# Patient Record
Sex: FEMALE | Race: WHITE | Employment: UNEMPLOYED | URBAN - METROPOLITAN AREA
[De-identification: names, ages, dates, MRNs, and addresses within clinical notes are randomized per-mention and may not be internally consistent; named-entity substitution may affect disease eponyms.]

---

## 2022-07-05 ENCOUNTER — HOSPITAL ENCOUNTER (INPATIENT)
Age: 32
LOS: 2 days | Discharge: HOME | DRG: 753 | End: 2022-07-07
Attending: EMERGENCY MEDICINE | Admitting: PSYCHIATRY & NEUROLOGY
Payer: COMMERCIAL

## 2022-07-05 VITALS
HEART RATE: 53 BPM | HEIGHT: 68 IN | DIASTOLIC BLOOD PRESSURE: 57 MMHG | WEIGHT: 214 LBS | SYSTOLIC BLOOD PRESSURE: 120 MMHG | BODY MASS INDEX: 32.43 KG/M2 | RESPIRATION RATE: 16 BRPM | TEMPERATURE: 98.6 F | OXYGEN SATURATION: 99 %

## 2022-07-05 DIAGNOSIS — F10.920 ACUTE ALCOHOLIC INTOXICATION WITHOUT COMPLICATION (HCC): Primary | ICD-10-CM

## 2022-07-05 DIAGNOSIS — F25.0 SCHIZOAFFECTIVE DISORDER, BIPOLAR TYPE (HCC): ICD-10-CM

## 2022-07-05 PROBLEM — F31.9 BIPOLAR 1 DISORDER, DEPRESSED (HCC): Status: ACTIVE | Noted: 2022-07-05

## 2022-07-05 LAB
ACETAMINOPHEN LEVEL: <5 UG/ML (ref 10–30)
ALBUMIN SERPL-MCNC: 4.7 G/DL (ref 3.5–4.6)
ALP BLD-CCNC: 50 U/L (ref 40–130)
ALT SERPL-CCNC: 20 U/L (ref 0–33)
AMPHETAMINE SCREEN, URINE: NORMAL
ANION GAP SERPL CALCULATED.3IONS-SCNC: 12 MEQ/L (ref 9–15)
AST SERPL-CCNC: 21 U/L (ref 0–35)
BACTERIA: NEGATIVE /HPF
BARBITURATE SCREEN URINE: NORMAL
BASOPHILS ABSOLUTE: 0.1 K/UL (ref 0–0.2)
BASOPHILS RELATIVE PERCENT: 1 %
BENZODIAZEPINE SCREEN, URINE: NORMAL
BILIRUB SERPL-MCNC: <0.2 MG/DL (ref 0.2–0.7)
BILIRUBIN URINE: NEGATIVE
BLOOD, URINE: ABNORMAL
BUN BLDV-MCNC: 6 MG/DL (ref 6–20)
CALCIUM SERPL-MCNC: 8.6 MG/DL (ref 8.5–9.9)
CANNABINOID SCREEN URINE: NORMAL
CHLORIDE BLD-SCNC: 111 MEQ/L (ref 95–107)
CHOLESTEROL, TOTAL: 138 MG/DL (ref 0–199)
CLARITY: CLEAR
CO2: 22 MEQ/L (ref 20–31)
COCAINE METABOLITE SCREEN URINE: NORMAL
COLOR: YELLOW
CREAT SERPL-MCNC: 0.63 MG/DL (ref 0.5–0.9)
EOSINOPHILS ABSOLUTE: 0.1 K/UL (ref 0–0.7)
EOSINOPHILS RELATIVE PERCENT: 0.9 %
EPITHELIAL CELLS, UA: ABNORMAL /HPF (ref 0–5)
ETHANOL PERCENT: 0.07 G/DL
ETHANOL PERCENT: 0.14 G/DL
ETHANOL: 158 MG/DL (ref 0–0.08)
ETHANOL: 77 MG/DL (ref 0–0.08)
FENTANYL SCREEN, URINE: NORMAL
GFR AFRICAN AMERICAN: >60
GFR NON-AFRICAN AMERICAN: >60
GLOBULIN: 2.6 G/DL (ref 2.3–3.5)
GLUCOSE BLD-MCNC: 107 MG/DL (ref 70–99)
GLUCOSE URINE: NEGATIVE MG/DL
HCT VFR BLD CALC: 39.6 % (ref 37–47)
HDLC SERPL-MCNC: 59 MG/DL (ref 40–59)
HEMOGLOBIN: 13 G/DL (ref 12–16)
HYALINE CASTS: ABNORMAL /HPF (ref 0–5)
KETONES, URINE: NEGATIVE MG/DL
LDL CHOLESTEROL CALCULATED: 64 MG/DL (ref 0–129)
LEUKOCYTE ESTERASE, URINE: NEGATIVE
LYMPHOCYTES ABSOLUTE: 3.4 K/UL (ref 1–4.8)
LYMPHOCYTES RELATIVE PERCENT: 44.7 %
Lab: NORMAL
MCH RBC QN AUTO: 26.8 PG (ref 27–31.3)
MCHC RBC AUTO-ENTMCNC: 32.8 % (ref 33–37)
MCV RBC AUTO: 81.7 FL (ref 82–100)
METHADONE SCREEN, URINE: NORMAL
MONOCYTES ABSOLUTE: 0.4 K/UL (ref 0.2–0.8)
MONOCYTES RELATIVE PERCENT: 5.7 %
NEUTROPHILS ABSOLUTE: 3.6 K/UL (ref 1.4–6.5)
NEUTROPHILS RELATIVE PERCENT: 47.7 %
NITRITE, URINE: NEGATIVE
OPIATE SCREEN URINE: NORMAL
OXYCODONE URINE: NORMAL
PDW BLD-RTO: 17.7 % (ref 11.5–14.5)
PH UA: 6 (ref 5–9)
PHENCYCLIDINE SCREEN URINE: NORMAL
PLATELET # BLD: 262 K/UL (ref 130–400)
POTASSIUM REFLEX MAGNESIUM: 4 MEQ/L (ref 3.4–4.9)
PROPOXYPHENE SCREEN: NORMAL
PROTEIN UA: NEGATIVE MG/DL
RBC # BLD: 4.85 M/UL (ref 4.2–5.4)
RBC UA: ABNORMAL /HPF (ref 0–5)
SALICYLATE, SERUM: <0.3 MG/DL (ref 15–30)
SARS-COV-2, NAAT: NOT DETECTED
SODIUM BLD-SCNC: 145 MEQ/L (ref 135–144)
SPECIFIC GRAVITY UA: 1.01 (ref 1–1.03)
TOTAL CK: 168 U/L (ref 0–170)
TOTAL PROTEIN: 7.3 G/DL (ref 6.3–8)
TRIGL SERPL-MCNC: 73 MG/DL (ref 0–150)
TSH REFLEX: 1.55 UIU/ML (ref 0.44–3.86)
URINE REFLEX TO CULTURE: ABNORMAL
UROBILINOGEN, URINE: 1 E.U./DL
WBC # BLD: 7.6 K/UL (ref 4.8–10.8)
WBC UA: ABNORMAL /HPF (ref 0–5)

## 2022-07-05 PROCEDURE — 82550 ASSAY OF CK (CPK): CPT

## 2022-07-05 PROCEDURE — 80307 DRUG TEST PRSMV CHEM ANLYZR: CPT

## 2022-07-05 PROCEDURE — 82077 ASSAY SPEC XCP UR&BREATH IA: CPT

## 2022-07-05 PROCEDURE — 80061 LIPID PANEL: CPT

## 2022-07-05 PROCEDURE — 1240000000 HC EMOTIONAL WELLNESS R&B

## 2022-07-05 PROCEDURE — 85025 COMPLETE CBC W/AUTO DIFF WBC: CPT

## 2022-07-05 PROCEDURE — 80143 DRUG ASSAY ACETAMINOPHEN: CPT

## 2022-07-05 PROCEDURE — 80179 DRUG ASSAY SALICYLATE: CPT

## 2022-07-05 PROCEDURE — 36415 COLL VENOUS BLD VENIPUNCTURE: CPT

## 2022-07-05 PROCEDURE — 81001 URINALYSIS AUTO W/SCOPE: CPT

## 2022-07-05 PROCEDURE — 80053 COMPREHEN METABOLIC PANEL: CPT

## 2022-07-05 PROCEDURE — 12002 RPR S/N/AX/GEN/TRNK2.6-7.5CM: CPT

## 2022-07-05 PROCEDURE — 99285 EMERGENCY DEPT VISIT HI MDM: CPT

## 2022-07-05 PROCEDURE — 84443 ASSAY THYROID STIM HORMONE: CPT

## 2022-07-05 PROCEDURE — 87635 SARS-COV-2 COVID-19 AMP PRB: CPT

## 2022-07-05 RX ORDER — HYDROXYZINE PAMOATE 50 MG/1
50 CAPSULE ORAL EVERY 6 HOURS PRN
Status: DISCONTINUED | OUTPATIENT
Start: 2022-07-05 | End: 2022-07-07 | Stop reason: HOSPADM

## 2022-07-05 RX ORDER — ACETAMINOPHEN 325 MG/1
650 TABLET ORAL EVERY 4 HOURS PRN
Status: DISCONTINUED | OUTPATIENT
Start: 2022-07-05 | End: 2022-07-07 | Stop reason: HOSPADM

## 2022-07-05 RX ORDER — ARIPIPRAZOLE 20 MG/1
20 TABLET ORAL DAILY
Status: ON HOLD | COMMUNITY
Start: 2022-05-26 | End: 2022-07-07 | Stop reason: HOSPADM

## 2022-07-05 RX ORDER — HYDROXYZINE HYDROCHLORIDE 50 MG/ML
50 INJECTION, SOLUTION INTRAMUSCULAR EVERY 6 HOURS PRN
Status: DISCONTINUED | OUTPATIENT
Start: 2022-07-05 | End: 2022-07-07 | Stop reason: HOSPADM

## 2022-07-05 RX ORDER — GABAPENTIN 300 MG/1
600 CAPSULE ORAL 2 TIMES DAILY
Status: DISCONTINUED | OUTPATIENT
Start: 2022-07-05 | End: 2022-07-07 | Stop reason: HOSPADM

## 2022-07-05 RX ORDER — GABAPENTIN 300 MG/1
600 CAPSULE ORAL 2 TIMES DAILY
Status: ON HOLD | COMMUNITY
Start: 2022-05-26 | End: 2022-10-28 | Stop reason: HOSPADM

## 2022-07-05 RX ORDER — BENZTROPINE MESYLATE 1 MG/ML
2 INJECTION INTRAMUSCULAR; INTRAVENOUS 2 TIMES DAILY PRN
Status: DISCONTINUED | OUTPATIENT
Start: 2022-07-05 | End: 2022-07-07 | Stop reason: HOSPADM

## 2022-07-05 RX ORDER — HALOPERIDOL 5 MG/ML
5 INJECTION INTRAMUSCULAR EVERY 6 HOURS PRN
Status: DISCONTINUED | OUTPATIENT
Start: 2022-07-05 | End: 2022-07-07 | Stop reason: HOSPADM

## 2022-07-05 RX ORDER — LIDOCAINE HYDROCHLORIDE 10 MG/ML
5 INJECTION, SOLUTION EPIDURAL; INFILTRATION; INTRACAUDAL; PERINEURAL ONCE
Status: DISCONTINUED | OUTPATIENT
Start: 2022-07-05 | End: 2022-07-05

## 2022-07-05 RX ORDER — MAGNESIUM HYDROXIDE/ALUMINUM HYDROXICE/SIMETHICONE 120; 1200; 1200 MG/30ML; MG/30ML; MG/30ML
30 SUSPENSION ORAL PRN
Status: DISCONTINUED | OUTPATIENT
Start: 2022-07-05 | End: 2022-07-07 | Stop reason: HOSPADM

## 2022-07-05 RX ORDER — TRAZODONE HYDROCHLORIDE 50 MG/1
50 TABLET ORAL NIGHTLY PRN
Status: DISCONTINUED | OUTPATIENT
Start: 2022-07-05 | End: 2022-07-07 | Stop reason: HOSPADM

## 2022-07-05 RX ORDER — HALOPERIDOL 5 MG
5 TABLET ORAL EVERY 6 HOURS PRN
Status: DISCONTINUED | OUTPATIENT
Start: 2022-07-05 | End: 2022-07-07 | Stop reason: HOSPADM

## 2022-07-05 RX ORDER — PHENOL 1.4 %
1 AEROSOL, SPRAY (ML) MUCOUS MEMBRANE NIGHTLY
Status: ON HOLD | COMMUNITY
Start: 2022-05-26 | End: 2022-10-28 | Stop reason: HOSPADM

## 2022-07-05 ASSESSMENT — LIFESTYLE VARIABLES
HOW OFTEN DO YOU HAVE A DRINK CONTAINING ALCOHOL: 2-3 TIMES A WEEK
HOW MANY STANDARD DRINKS CONTAINING ALCOHOL DO YOU HAVE ON A TYPICAL DAY: 3 OR 4
HOW MANY STANDARD DRINKS CONTAINING ALCOHOL DO YOU HAVE ON A TYPICAL DAY: 1 OR 2
HOW OFTEN DO YOU HAVE A DRINK CONTAINING ALCOHOL: 2-3 TIMES A WEEK

## 2022-07-05 ASSESSMENT — SLEEP AND FATIGUE QUESTIONNAIRES
SLEEP PATTERN: DIFFICULTY FALLING ASLEEP;RESTLESSNESS
AVERAGE NUMBER OF SLEEP HOURS: 4
DO YOU USE A SLEEP AID: NO
DO YOU HAVE DIFFICULTY SLEEPING: YES

## 2022-07-05 ASSESSMENT — PAIN - FUNCTIONAL ASSESSMENT: PAIN_FUNCTIONAL_ASSESSMENT: NONE - DENIES PAIN

## 2022-07-05 ASSESSMENT — PATIENT HEALTH QUESTIONNAIRE - PHQ9: SUM OF ALL RESPONSES TO PHQ QUESTIONS 1-9: 12

## 2022-07-05 NOTE — ED NOTES
Patient to [facility] AN AFFILIATE OF [facility]   Patient changed into psych approved clothing   Skin assessment completed by [facility] RN        Raina Crowley RN  07/05/22 8475

## 2022-07-05 NOTE — ED NOTES
Breakfast tray remains untouched. Continues to rest quietly with eyes closed & no acute distress noted.      Whit Ferreira RN  07/05/22 6529

## 2022-07-05 NOTE — ED NOTES
Awaiting alcohol redraw per report from [facility]. Resting with eyes closed & no acute distress noted.      Tab Wan RN  07/05/22 6614

## 2022-07-05 NOTE — ED NOTES
Spoke with Edith Salazar in the Lab to notify of need for alcohol redraw.      Savi Carrington RN  07/05/22 3237
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
## 2022-07-05 NOTE — ED NOTES
Patient updated on plan of care and admission to 3. Patient verbalizes understanding. No lunch eaten. Patient states she has no appetite at this time. Call placed to [facility] for belongings and escort to 3.       Erin Siu RN  07/05/22 7477

## 2022-07-05 NOTE — PROGRESS NOTES
Pt refused offer for vistaril for anxiety and tylenol for pain as pt states tylenol doesn't work, pt refused Neurontin 300 mg , 2 capsules were returned as pt reports she takes them tid prn. Pt was tearful, stated her heart is broke, encouraged pt to ask for the prn meds if she changes her mind, pt reports not eating lunch, offer for snacks were given.
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
## 2022-07-05 NOTE — PROGRESS NOTES
Pt arrived on unit 1303 by wheelchair. Pt appears agitated. Pt has strong gait and walked to room 384. Skin and contraband check performed by this RN and Melonie Better RN. Contraband negative. Pt has healing sutures on right forearm above AC. Pt also has healed scar on upper right arm. SI/HI. Pt given an toothbrush/toothpaste, soap, socks, deodorant, and water pitcher. Pt denied further needs. Pt aware of the need to complete admission assessment and sign consents. Pt currently laying in bed comfortably.
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
## 2022-07-05 NOTE — ED NOTES
Per report of Dr Melany Ma patient endorsed that she is intoxicated  Patient will not be assessed until blood ETOH level results and is at or below legal limit.      Anny Wells RN  07/05/22 1970

## 2022-07-06 PROCEDURE — 1240000000 HC EMOTIONAL WELLNESS R&B

## 2022-07-06 PROCEDURE — 99223 1ST HOSP IP/OBS HIGH 75: CPT | Performed by: PSYCHIATRY & NEUROLOGY

## 2022-07-06 NOTE — PROGRESS NOTES
Patient did not attend group despite staff encouragement. .Electronically signed by [employer] on 7/6/2022 at 12:13 PM

## 2022-07-06 NOTE — CARE COORDINATION
BHI Biopsychosocial Assessment    Current Level of Psychosocial Functioning     Independent   Dependent    Minimal Assist x    Comments:  Patient was living with her boyfriend. She is not sure whether they will reconcile. Patient works and receives some government assistance. Psychosocial High Risk Factors (check all that apply)    Unable to obtain meds   Chronic illness/pain    Substance abuse   Lack of Family Support x  Financial stress   Isolation   Inadequate Community Resources x  Suicide attempt(s)  Not taking medications x  Victim of crime   Developmental Delay  Unable to manage personal needs    Age 72 or older   Homeless  No transportation   Readmission within 30 days  Unemployment  Traumatic Event    Comments: Patient has at least three high risk factors associated with this admission. Psychiatric Advanced Directives: None Reported. Family to Involve in Treatment: Patient stated she has no one for staff to contact on her behalf. Sexual Orientation:  Patient was recently in a heterosexual relationship. Patient Strengths: Patient is seeking assistance at the [facility]. She has a pending appointment. Patient Barriers: Patient believes she will not be able to return to her living situation due to the disruption in her relationship with her boyfriend. Opiate Education Provided:  N/A    CMHC/mental health history: None Reported. Plan of Care   medication management, group/individual therapies, family meetings, psycho -education, treatment team meetings to assist with stabilization    Initial Discharge Plan:  Patient is seeking alternative living situation and has an appointment at [facility]. Clinical Summary:    Patient is a 33year old female who was admitted to the [facility] due to a mental health decompensation. Reportedly, patient presented to the ER with a cut to her right forearm, alcohol intoxication, and an escalation in disruptive behaviors.  All of these conditions are associated with a recent argument. When interviewed, patient verbalized several negative thoughts. She did not think it was a good morning, she expressed her discontent with her former partner, and said no one is willing to help her anymore. Patient was hesitantly cooperative when answering questions. She indicated she has a connection at the [facility]. Patient was extremely discharged focused. Her plan is to speak to the doctor about discharge.      Electronically signed by Candice Avina on 7/6/2022 at 9:59 AM
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
## 2022-07-06 NOTE — PROGRESS NOTES
Patient did not attend group despite staff encouragement.   Electronically signed by Kisha Alexander on 7/5/2022 at 9:17 PM

## 2022-07-06 NOTE — PROGRESS NOTES
Pt. refused to attend the 0900 community meeting. .Electronically signed by Lynne Otero on 7/6/2022 at 9:31 AM

## 2022-07-06 NOTE — PROGRESS NOTES
Patient did not attend group despite staff encouragement.   Electronically signed by Zulma Maher on 7/5/2022 at 9:10 PM

## 2022-07-06 NOTE — CARE COORDINATION
Brief Intervention and Referral to Treatment Summary    Patient was provided PHQ-9, AUDIT-C and DAST Screening:      PHQ-9 Score: 12  AUDIT-C Score: 5  DAST Score:  0    Patients substance use is considered     Low Risk/Healthy  Moderate Risk x  Harmful  Dependent    Patients depression is considered:     Minimal  Mild   Moderate x  Moderately Severe  Severe    Brief Education Was Provided N/A    Patient was receptive  Patient was not receptive      Brief Intervention Is Provided (Only for AUDIT-C or DAST) N/A    Patient reports readiness to decrease and/or stop use and a plan was discussed   Patient denies readiness to decrease and/or stop use and a plan was not discussed      Recommendations/Referrals for Brief and/or Specialized Treatment Provided to Patient   Patient denied any current difficulties with drugs or alcohol. As a result, no brief education or intervention was completed.      Electronically signed by Ede Madrid on 7/6/2022 at 9:44 AM

## 2022-07-06 NOTE — CONSULTS
[facility]  MEDICINE    HISTORY AND PHYSICAL EXAM    PATIENT NAME:  Jung Alan    MRN:  [id number]  SERVICE DATE:  7/6/2022   SERVICE TIME:  8:51 AM    Primary Care Physician: No primary care provider on file. SUBJECTIVE  CHIEF COMPLAINT:  Medically appropriate for inpatient psychiatry admission. Consult for medical H/P encounter. HPI:  This is a 33 y.o. female who is admitted to the behavioral health unit for further psychiatric evaluation. Patient initially presented to the emergency department due to self-inflicted right forearm laceration. She was noted to be intoxicated. Admitted to depression and self-injurious behavior. Wound was sutured in the emergency department patient was medically cleared for behavioral health admission. Hospitalist service has been placed on consult for medical evaluation. All pertinent lab work, imaging, vital signs, documentation has been reviewed. Patient is seen and examined. Affect is agitated, speech is quiet, slow, almost inaudible/mumbling. She has fair cooperation with exam.  States that she does not want to be admitted. She endorses headache, abdominal pain, nausea. She declined Maalox or any other intervention to assist with her discomfort. PAST MEDICAL HISTORY:    Past Medical History:   Diagnosis Date    ADHD     Bipolar depression ([facility] [address] [facility]     Schizoaffective disorder ([facility] [address]      PAST SURGICAL HISTORY:    Past Surgical History:   Procedure Laterality Date    LEEP      NASAL SEPTUM SURGERY       FAMILY HISTORY:  History reviewed. No pertinent family history.   SOCIAL HISTORY:    Social History     Socioeconomic History    Marital status:      Spouse name: Not on file    Number of children: Not on file    Years of education: Not on file    Highest education level: Not on file   Occupational History    Not on file   Tobacco Use    Smoking status: Current Every Day Smoker    Smokeless tobacco: Never Used   Substance and Sexual Activity    Alcohol use: Yes    Drug use: Yes     Types: Marijuana [employer])    Sexual activity: Not on file   Other Topics Concern    Not on file   Social History Narrative    Not on file     Social Determinants of Health     Financial Resource Strain:     Difficulty of Paying Living Expenses: Not on file   Food Insecurity:     Worried About Running Out of Food in the Last Year: Not on file    Delmy of Food in the Last Year: Not on file   Transportation Needs:     Lack of Transportation (Medical): Not on file    Lack of Transportation (Non-Medical):  Not on file   Physical Activity:     Days of Exercise per Week: Not on file    Minutes of Exercise per Session: Not on file   Stress:     Feeling of Stress : Not on file   Social Connections:     Frequency of Communication with Friends and Family: Not on file    Frequency of Social Gatherings with Friends and Family: Not on file    Attends Mandaen Services: Not on file    Active Member of [address] [place] [address] or Organizations: Not on file    Attends Club or Organization Meetings: Not on file    Marital Status: Not on file   Intimate Partner Violence:     Fear of Current or Ex-Partner: Not on file    Emotionally Abused: Not on file    Physically Abused: Not on file    Sexually Abused: Not on file   Housing Stability:     Unable to Pay for Housing in the Last Year: Not on file    Number of [place] in the Last Year: Not on file    Unstable Housing in the Last Year: Not on file     MEDICATIONS:    Current Facility-Administered Medications   Medication Dose Route Frequency Provider Last Rate Last Admin    acetaminophen (TYLENOL) tablet 650 mg  650 mg Oral Q4H PRN Valorie Owens MD        magnesium hydroxide (MILK OF MAGNESIA) 400 MG/5ML suspension 30 mL  30 mL Oral Daily PRN Valorie Owens MD        aluminum & magnesium hydroxide-simethicone (MAALOX) 200-200-20 MG/5ML suspension 30 mL  30 mL Oral PRN Valorie Owens MD        haloperidol (HALDOL) tablet 5 mg  5 mg Oral Q6H PRN Minoo Justin MD        Or    haloperidol lactate (HALDOL) injection 5 mg  5 mg IntraMUSCular Q6H PRN Minoo Justin MD        benztropine mesylate (COGENTIN) injection 2 mg  2 mg IntraMUSCular BID PRN Minoo Justin MD        traZODone (DESYREL) tablet 50 mg  50 mg Oral Nightly PRN Minoo Justin MD        hydrOXYzine (VISTARIL) injection 50 mg  50 mg IntraMUSCular Q6H PRN Minoo Justin MD        Or    hydrOXYzine pamoate (VISTARIL) capsule 50 mg  50 mg Oral Q6H PRN Minoo Justin MD        gabapentin (NEURONTIN) capsule 600 mg  600 mg Oral BID Minoo Justin MD           ALLERGIES: Patient has no known allergies. REVIEW OF SYSTEM:   ROS as noted in HPI, 12 point ROS reviewed and otherwise negative. OBJECTIVE  PHYSICAL EXAM: BP (!) 120/57   Pulse 53   Temp 98.6 °F (37 °C)   Resp 16   Ht 5' 8\" (1.727 m)   Wt 214 lb (97.1 kg)   LMP 07/04/2022   SpO2 99%   BMI 32.54 kg/m²   CONSTITUTIONAL:  awake, alert, fair cooperation, no apparent distress, and appears stated age  EYES:  Lids and lashes normal, sclera clear, conjunctiva normal  ENT:  Normocephalic, without obvious abnormality,  NECK:  Supple, symmetrical, trachea midline, no adenopathy, skin normal  LUNGS:  No increased work of breathing, good air exchange, clear to auscultation bilaterally, no crackles or wheezing  CARDIOVASCULAR:  Normal apical impulse, regular rate and rhythm, normal S1 and S2, no S3 or S4, and no murmur noted  ABDOMEN:  Normal bowel sounds, soft, non-distended, mild mid-epigastric tenderness. MUSCULOSKELETAL:  There is no redness, warmth, or swelling of the joints. Tone is normal.  NEUROLOGIC:  Awake, alert, oriented to name, place and time. Gait is not assessed  SKIN:  Left arm laceration with sutures, steri strips    DATA:     Diagnostic tests reviewed for today's visit:    Most recent labs and imaging results reviewed.      VTE Prophylaxis:     ASSESSMENT AND PLAN  Principal Problem:    Bipolar 1 disorder, depressed ([facility] [address]  Plan:     Bipolar 1 disorder, depressed  Self-injurious behavior  - Patient admitted to behavorial health for evaluation and treatment. Further POC for behavioral therapy and psychiatric regimen per primary team    Left arm laceration, self-inflicted  - Sutured in ER. Continue local wound care per protocol        This is only a history and physical examination and not medical management. The patient is to contact and follow up with their primary care physician and go over any abnormal labs, imaging, findings, medical concerns, or conditions that we have and have not addressed during this encounter.     Plan of care discussed with: patient    SIGNATURE: JUSTIN Kearney - CNP  DATE: July 6, 2022  TIME: 8:51 AM

## 2022-07-06 NOTE — PROGRESS NOTES
Patient did not attend group despite staff encouragement.   Electronically signed by Faraz Farley on 7/5/2022 at 9:32 PM

## 2022-07-06 NOTE — PROGRESS NOTES
Behavioral Services  Medicare Certification Upon Admission    I certify that this patient's inpatient psychiatric hospital admission is medically necessary for:    [x] (1) Treatment which could reasonably be expected to improve this patient's condition,       [x] (2) Or for diagnostic study;     AND     [x](2) The inpatient psychiatric services are provided while the individual is under the care of a physician and are included in the individualized plan of care.     Estimated length of stay/service 3-5 days    Plan for post-hospital care Op care    Electronically signed by Roshan Wheeler MD on 7/6/2022 at 10:37 AM

## 2022-07-06 NOTE — FLOWSHEET NOTE
After seeing doctor patient went back to room and began to sob loudly. Did initially talk to this nurse. Wants to go home. States she has heard a lot of bad things about this hospital and just wants to go. Admits to having problems with her boyfriend prior to coming to the hospital. Has no way to contact him now as he does not have a phone. Pt states she also does not have her phone to contact anyone else. Repeats that she might lose both of her jobs but it doesn't matter anyway. Says she wants to go back to [facility] [employer]. Encouraged patient to take medications. Pt states that she does not trust any medication from this hospital and will not take anything. Educated on vistaril. Pt continues to decline. Denies SI/HI/AVH. WIll not answer when asked about depression and anxiety. Declining food/drinks. Pt given pepsi anyway. Pt does not want to contact her family because she believes they have enough to worry about with the recent loss of her mother. Pt says she misses her son that is 8. He is with family in [employer] [place]. At the end of interview patient would no longer respond verbally. Would not consent to physical or vitals. Will reapproach later.

## 2022-07-07 PROCEDURE — 99239 HOSP IP/OBS DSCHRG MGMT >30: CPT | Performed by: PSYCHIATRY & NEUROLOGY

## 2022-07-07 RX ORDER — ARIPIPRAZOLE 5 MG/1
5 TABLET ORAL DAILY
Qty: 30 TABLET | Refills: 3 | Status: ON HOLD | OUTPATIENT
Start: 2022-07-07 | End: 2022-10-28 | Stop reason: HOSPADM
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
## 2022-07-07 NOTE — PROGRESS NOTES
Pt denies depression or anxiety; pt isolates to room , stays in bed, does not interact with staff or other pts. Pt comes out to get meals & takes it back to room. Pt also refuses all meds.

## 2022-07-07 NOTE — PROGRESS NOTES
Pt. refused to attend the 0900 community meeting. Electronically signed by Control4 on 7/7/2022 at 9:32 AM

## 2022-07-07 NOTE — CARE COORDINATION
Family/Support Name: Abdiel Zelaya #:  [phone]  Relationship to Patient: boyfriend    Placed call to above for collateral information,    Response: left hippa compliant voicemail requesting return call.  Electronically signed by SHELBY Norman on 7/7/2022 at 10:04 AM
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
## 2022-07-07 NOTE — DISCHARGE INSTR - DIET

## 2022-07-07 NOTE — PROGRESS NOTES
Discharge instructions reviewed with pt. Pt expressed understanding verbally and by signature. Pt given personal belongings, picked up by boyfriend and discharged to home. Pt denies SI/HI/AVH, anxiety and depression.

## 2022-10-22 ENCOUNTER — HOSPITAL ENCOUNTER (INPATIENT)
Age: 32
LOS: 5 days | Discharge: HOME | DRG: 885 | End: 2022-10-28
Attending: EMERGENCY MEDICINE | Admitting: PSYCHIATRY & NEUROLOGY
Payer: COMMERCIAL

## 2022-10-22 DIAGNOSIS — T42.6X4A GABAPENTIN OVERDOSE, UNDETERMINED INTENT, INITIAL ENCOUNTER: ICD-10-CM

## 2022-10-22 DIAGNOSIS — F31.9 BIPOLAR DEPRESSION (HCC): Primary | ICD-10-CM

## 2022-10-22 LAB
ACETAMINOPHEN LEVEL: <5 UG/ML (ref 10–30)
ALBUMIN SERPL-MCNC: 4.7 G/DL (ref 3.5–4.6)
ALP BLD-CCNC: 46 U/L (ref 40–130)
ALT SERPL-CCNC: 15 U/L (ref 0–33)
AMPHETAMINE SCREEN, URINE: NORMAL
ANION GAP SERPL CALCULATED.3IONS-SCNC: 11 MEQ/L (ref 9–15)
AST SERPL-CCNC: 16 U/L (ref 0–35)
BARBITURATE SCREEN URINE: NORMAL
BASOPHILS ABSOLUTE: 0 K/UL (ref 0–0.2)
BASOPHILS RELATIVE PERCENT: 0.6 %
BENZODIAZEPINE SCREEN, URINE: NORMAL
BILIRUB SERPL-MCNC: <0.2 MG/DL (ref 0.2–0.7)
BILIRUBIN URINE: NEGATIVE
BLOOD, URINE: NEGATIVE
BUN BLDV-MCNC: 9 MG/DL (ref 6–20)
CALCIUM SERPL-MCNC: 8.9 MG/DL (ref 8.5–9.9)
CANNABINOID SCREEN URINE: NORMAL
CHLORIDE BLD-SCNC: 109 MEQ/L (ref 95–107)
CHOLESTEROL, TOTAL: 139 MG/DL (ref 0–199)
CLARITY: CLEAR
CO2: 23 MEQ/L (ref 20–31)
COCAINE METABOLITE SCREEN URINE: NORMAL
COLOR: YELLOW
CREAT SERPL-MCNC: 0.66 MG/DL (ref 0.5–0.9)
EOSINOPHILS ABSOLUTE: 0.2 K/UL (ref 0–0.7)
EOSINOPHILS RELATIVE PERCENT: 2.3 %
ETHANOL PERCENT: 0.02 G/DL
ETHANOL: 24 MG/DL (ref 0–0.08)
FENTANYL SCREEN, URINE: NORMAL
GFR SERPL CREATININE-BSD FRML MDRD: >60 ML/MIN/{1.73_M2}
GLOBULIN: 2.5 G/DL (ref 2.3–3.5)
GLUCOSE BLD-MCNC: 86 MG/DL (ref 70–99)
GLUCOSE URINE: NEGATIVE MG/DL
HCG(URINE) PREGNANCY TEST: NEGATIVE
HCT VFR BLD CALC: 39.7 % (ref 37–47)
HDLC SERPL-MCNC: 59 MG/DL (ref 40–59)
HEMOGLOBIN: 13.5 G/DL (ref 12–16)
KETONES, URINE: NEGATIVE MG/DL
LDL CHOLESTEROL CALCULATED: 61 MG/DL (ref 0–129)
LEUKOCYTE ESTERASE, URINE: NEGATIVE
LYMPHOCYTES ABSOLUTE: 2.4 K/UL (ref 1–4.8)
LYMPHOCYTES RELATIVE PERCENT: 33.6 %
Lab: NORMAL
MCH RBC QN AUTO: 30 PG (ref 27–31.3)
MCHC RBC AUTO-ENTMCNC: 34.1 % (ref 33–37)
MCV RBC AUTO: 87.8 FL (ref 79.4–94.8)
METHADONE SCREEN, URINE: NORMAL
MONOCYTES ABSOLUTE: 0.4 K/UL (ref 0.2–0.8)
MONOCYTES RELATIVE PERCENT: 5.8 %
NEUTROPHILS ABSOLUTE: 4 K/UL (ref 1.4–6.5)
NEUTROPHILS RELATIVE PERCENT: 57.7 %
NITRITE, URINE: NEGATIVE
OPIATE SCREEN URINE: NORMAL
OXYCODONE URINE: NORMAL
PDW BLD-RTO: 13.5 % (ref 11.5–14.5)
PH UA: 5 (ref 5–9)
PHENCYCLIDINE SCREEN URINE: NORMAL
PLATELET # BLD: 231 K/UL (ref 130–400)
POTASSIUM REFLEX MAGNESIUM: 4.1 MEQ/L (ref 3.4–4.9)
PROPOXYPHENE SCREEN: NORMAL
PROTEIN UA: NEGATIVE MG/DL
RBC # BLD: 4.52 M/UL (ref 4.2–5.4)
SALICYLATE, SERUM: <0.3 MG/DL (ref 15–30)
SARS-COV-2, NAAT: NOT DETECTED
SODIUM BLD-SCNC: 143 MEQ/L (ref 135–144)
SPECIFIC GRAVITY UA: 1.02 (ref 1–1.03)
TOTAL CK: 70 U/L (ref 0–170)
TOTAL PROTEIN: 7.2 G/DL (ref 6.3–8)
TRIGL SERPL-MCNC: 95 MG/DL (ref 0–150)
TSH REFLEX: 1.32 UIU/ML (ref 0.44–3.86)
URINE REFLEX TO CULTURE: NORMAL
UROBILINOGEN, URINE: 0.2 E.U./DL
WBC # BLD: 7 K/UL (ref 4.8–10.8)

## 2022-10-22 PROCEDURE — 80053 COMPREHEN METABOLIC PANEL: CPT

## 2022-10-22 PROCEDURE — 85025 COMPLETE CBC W/AUTO DIFF WBC: CPT

## 2022-10-22 PROCEDURE — 80179 DRUG ASSAY SALICYLATE: CPT

## 2022-10-22 PROCEDURE — 82077 ASSAY SPEC XCP UR&BREATH IA: CPT

## 2022-10-22 PROCEDURE — 93005 ELECTROCARDIOGRAM TRACING: CPT | Performed by: EMERGENCY MEDICINE

## 2022-10-22 PROCEDURE — 80307 DRUG TEST PRSMV CHEM ANLYZR: CPT

## 2022-10-22 PROCEDURE — 82550 ASSAY OF CK (CPK): CPT

## 2022-10-22 PROCEDURE — 84703 CHORIONIC GONADOTROPIN ASSAY: CPT

## 2022-10-22 PROCEDURE — 80143 DRUG ASSAY ACETAMINOPHEN: CPT

## 2022-10-22 PROCEDURE — 99285 EMERGENCY DEPT VISIT HI MDM: CPT

## 2022-10-22 PROCEDURE — 51701 INSERT BLADDER CATHETER: CPT

## 2022-10-22 PROCEDURE — 84443 ASSAY THYROID STIM HORMONE: CPT

## 2022-10-22 PROCEDURE — 81003 URINALYSIS AUTO W/O SCOPE: CPT

## 2022-10-22 PROCEDURE — 80061 LIPID PANEL: CPT

## 2022-10-22 PROCEDURE — 87635 SARS-COV-2 COVID-19 AMP PRB: CPT

## 2022-10-22 ASSESSMENT — ENCOUNTER SYMPTOMS: VOMITING: 0

## 2022-10-22 ASSESSMENT — PAIN - FUNCTIONAL ASSESSMENT: PAIN_FUNCTIONAL_ASSESSMENT: NONE - DENIES PAIN

## 2022-10-23 PROBLEM — F31.9 BIPOLAR DEPRESSION (HCC): Status: ACTIVE | Noted: 2022-10-23

## 2022-10-23 LAB
HCG, URINE, POC: NEGATIVE
Lab: 0
NEGATIVE QC PASS/FAIL: NORMAL
POSITIVE QC PASS/FAIL: NORMAL

## 2022-10-23 PROCEDURE — 1240000000 HC EMOTIONAL WELLNESS R&B

## 2022-10-23 PROCEDURE — 2580000003 HC RX 258

## 2022-10-23 PROCEDURE — 2580000003 HC RX 258: Performed by: EMERGENCY MEDICINE

## 2022-10-23 PROCEDURE — 6370000000 HC RX 637 (ALT 250 FOR IP): Performed by: PSYCHIATRY & NEUROLOGY

## 2022-10-23 RX ORDER — NICOTINE 21 MG/24HR
1 PATCH, TRANSDERMAL 24 HOURS TRANSDERMAL DAILY
Status: DISCONTINUED | OUTPATIENT
Start: 2022-10-23 | End: 2022-10-28 | Stop reason: HOSPADM

## 2022-10-23 RX ORDER — HYDROXYZINE HYDROCHLORIDE 50 MG/ML
50 INJECTION, SOLUTION INTRAMUSCULAR EVERY 6 HOURS PRN
Status: DISCONTINUED | OUTPATIENT
Start: 2022-10-23 | End: 2022-10-28 | Stop reason: HOSPADM

## 2022-10-23 RX ORDER — 0.9 % SODIUM CHLORIDE 0.9 %
1000 INTRAVENOUS SOLUTION INTRAVENOUS ONCE
Status: COMPLETED | OUTPATIENT
Start: 2022-10-23 | End: 2022-10-23

## 2022-10-23 RX ORDER — HALOPERIDOL 5 MG/1
5 TABLET ORAL EVERY 6 HOURS PRN
Status: DISCONTINUED | OUTPATIENT
Start: 2022-10-23 | End: 2022-10-28 | Stop reason: HOSPADM

## 2022-10-23 RX ORDER — SODIUM CHLORIDE 9 MG/ML
INJECTION, SOLUTION INTRAVENOUS
Status: COMPLETED
Start: 2022-10-23 | End: 2022-10-23

## 2022-10-23 RX ORDER — TRAZODONE HYDROCHLORIDE 50 MG/1
50 TABLET ORAL NIGHTLY PRN
Status: DISCONTINUED | OUTPATIENT
Start: 2022-10-23 | End: 2022-10-26

## 2022-10-23 RX ORDER — ACETAMINOPHEN 325 MG/1
650 TABLET ORAL EVERY 4 HOURS PRN
Status: DISCONTINUED | OUTPATIENT
Start: 2022-10-23 | End: 2022-10-28 | Stop reason: HOSPADM

## 2022-10-23 RX ORDER — BENZTROPINE MESYLATE 1 MG/ML
2 INJECTION INTRAMUSCULAR; INTRAVENOUS 2 TIMES DAILY PRN
Status: DISCONTINUED | OUTPATIENT
Start: 2022-10-23 | End: 2022-10-28 | Stop reason: HOSPADM

## 2022-10-23 RX ORDER — HYDROXYZINE PAMOATE 50 MG/1
50 CAPSULE ORAL EVERY 6 HOURS PRN
Status: DISCONTINUED | OUTPATIENT
Start: 2022-10-23 | End: 2022-10-28 | Stop reason: HOSPADM

## 2022-10-23 RX ORDER — HALOPERIDOL 5 MG/ML
5 INJECTION INTRAMUSCULAR EVERY 6 HOURS PRN
Status: DISCONTINUED | OUTPATIENT
Start: 2022-10-23 | End: 2022-10-28 | Stop reason: HOSPADM

## 2022-10-23 RX ADMIN — Medication 1000 ML: at 01:25

## 2022-10-23 RX ADMIN — SODIUM CHLORIDE 1000 ML: 9 INJECTION, SOLUTION INTRAVENOUS at 03:31

## 2022-10-23 RX ADMIN — SODIUM CHLORIDE 1000 ML: 9 INJECTION, SOLUTION INTRAVENOUS at 01:25

## 2022-10-23 ASSESSMENT — LIFESTYLE VARIABLES
HOW OFTEN DO YOU HAVE A DRINK CONTAINING ALCOHOL: MONTHLY OR LESS
HOW MANY STANDARD DRINKS CONTAINING ALCOHOL DO YOU HAVE ON A TYPICAL DAY: 3 OR 4

## 2022-10-23 ASSESSMENT — SLEEP AND FATIGUE QUESTIONNAIRES
DO YOU HAVE DIFFICULTY SLEEPING: NO
DO YOU USE A SLEEP AID: NO
AVERAGE NUMBER OF SLEEP HOURS: 12

## 2022-10-23 ASSESSMENT — PATIENT HEALTH QUESTIONNAIRE - PHQ9: SUM OF ALL RESPONSES TO PHQ QUESTIONS 1-9: 16

## 2022-10-23 ASSESSMENT — PAIN - FUNCTIONAL ASSESSMENT: PAIN_FUNCTIONAL_ASSESSMENT: NONE - DENIES PAIN

## 2022-10-23 NOTE — PROGRESS NOTES
Pt. presents with flat affect, low monotone of voice. Familiar with this writer form past admission in July '22. Reports  living with bf, with whom she argues constantly. \"We dont belong together. All we do is ague. Its a love hate thing. \"  Reports OD on meds due to being stressed out. \"I figured the more the better. \" Has been non compliant with meds for awhile. Reports increased sleeping and ok appetite. Has friends and family is supportive. Low motivation. Anhedonia. Attempts nothing so so doesn't feel bad failing. Has a job at [employer] half month. Has a court date 11-14-22 for failure to pay rent. Denies AH/VH, and denies any current si/hi. Has a 8year old son who lives with her.  Stressors include  1.adult life  2.expenses  3.work  *shop, watch movies, spend time with son and family  Electronically signed by Josue Santoyo on 10/23/2022 at 2:03 PM

## 2022-10-23 NOTE — ED NOTES
Poison control contacted regarding patient  They reported gabapentin can cause CNS and respiratory depression   Recommended minimum 6 hours of observation      Babak Gómez RN  10/22/22 7851

## 2022-10-23 NOTE — PROGRESS NOTES
Pt isolating to room, flat affect. Pt voiced increase anxiety, stressors. Pt minimizing admit to [facility]. Pt voiced non-compliant medication regime. Pt reports poor appetite. Pt reports poor sleep, due to trouble falling asleep,staying asleep. Pt rates anxiety 6/10. Pt rates depression 8/10. On a scale from 1 through 10, 10 being the highest. Pt reports attending groups. Pt denies SI, HI and A/V hallucinations. Will continue to monitor.

## 2022-10-23 NOTE — ED NOTES
Discussed case with Dr Kelvin Noel. Agree patient needs to be admitted, but would like her to be held in ED longer, as she is still very sedated. States to call him back when she is more alert on her own.      Sandi Bell RN  10/23/22 1125

## 2022-10-23 NOTE — ED NOTES
Dr Nanette Arguelles updated patient is now alert, oriented, and eating well. States admit to 3w, 3w PRNs.      Song Vyas RN  10/23/22 2275

## 2022-10-23 NOTE — ED NOTES
Patient straight cathed for urine sample without complications  Patient remains lethargic      Sincere Francis, RN  10/22/22 3380

## 2022-10-23 NOTE — PROGRESS NOTES
Report from [facility] in [facility]. Patient admitted on a pink slip by [facility] and brought in by EMS from urgent care after admitting to intentionally ingesting 90 300 mg Gabapentin. Patient was monitored medically in the ED before being assessed by psych. Patient reports she first told her sister, who had her come to Urgent care, then the ED. She reports this was not a suicide attempt, states she was only taking it to \"relax\" due to feeling very stressed about multiple things going on in her life. She does not want to speak more on these issue. Patient states she only takes her Gabapentin PRN for anxiety, and will often take it in larger then prescribed dosages. She makes passive death wish comments about wanting to be with family who are dead. Patient has a history of cutting. Patient last admitted to  7/5/22 - 7/7/22.     TOX negative  BAL negative  Preg negative

## 2022-10-23 NOTE — ED NOTES
Patient to [facility] [place] [address] without any complications     Eder Valadez, RN  10/23/22 5196

## 2022-10-23 NOTE — ED NOTES
Provisional Diagnosis:   Major Depression w/o psych features    Psychosocial and Contextual Factors:   None compliant with OP therapy or medications    C-SSRS Summary:  Denies    Patient: Denies  Family: None contacted  Agency: [facility]    Substance Abuse None    Present Suicidal Behavior:  None    Verbal: Denies    Attempt: None    Past Suicidal Behavior:     Verbal:    Attempt:      Self-Injurious/Self-Mutilation: Hx of being a cutter      Violence Current or Past None      Trauma Identified:  None    Protective Factors:    None      Risk Factors:    None compliance with any OP regime      Clinical Summary:    27year old female presented to ED after taking a reported 90 300mg Gabapentin around 1500, arrived at 2029. She reports she told her sister, who had her come to Urgent care then ED. She reports this was not a suicide attempt, states she was only taking it to \"relax\" due to feeling very stressed about multiple things going on in her life. She does not want to speak more on these issue. Patient states she only takes her Gabapentin PRN for anxiety, and will often take it in larger then prescribed dosages. She makes passive death wish comments about wanting to be with family who are dead.         Level of Care Disposition:    pending       Laquita Ayers RN  10/23/22 4825

## 2022-10-23 NOTE — ED NOTES
2nd call out to Dr Shant Scales, no answer. 3w notified we are attempting to reach doctor.      Cyndie Kaye, RN  10/23/22 800 [facility], RN  10/23/22 4440

## 2022-10-23 NOTE — ED TRIAGE NOTES
Pt. Arrived to ED via EMS from Urgent Care after she took \"an entire bottle of gabapentin\". She states that she was trying to \"relax and forget about life\". Patient is alert, oriented, and calm in triage.

## 2022-10-23 NOTE — ED NOTES
Patient medicated per orders  Patient responsive to verbal stimuli      Zuhair Rae RN  10/23/22 9616
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
## 2022-10-23 NOTE — ED PROVIDER NOTES
[facility] ED  EMERGENCY DEPARTMENT ENCOUNTER      Pt Name: Jason Barraza  MRN: [id number]  [place] 1990  Date of evaluation: 10/22/2022  Provider: Imelda Rowe MD    CHIEF COMPLAINT       Chief Complaint   Patient presents with    Drug Overdose     On gabapentin          HISTORY OF PRESENT ILLNESS   (Location/Symptom, Timing/Onset, Context/Setting, Quality, Duration, Modifying Factors, Severity)  Note limiting factors. Jason Barraza is a 27 y.o. female who presents to the emergency department for evaluation of overdose. History of ADHD, bipolar disorder. Reports history of cutting behavior. Says that 1 to 2 weeks ago she took half a bottle of gabapentin \" to take the edge off, I have been very overwhelmed and stressed, there is a lot going on\". Reports around 1500 she took 90, 300mg tabs over a couple of hours. She then told her sister who asked her to go to the hospital and she went to urgent care, presents via EMS. When asked if she did this to hurt herself she states no. However when asked if she is suicidal or wants to die she states \" sometimes I want to go with my brother her mother who are not around anymore, they are dead\". No reported traumatic injury, chest pain, vomiting, numbness or weakness. Currently reporting that she feels intoxicated, disoriented. Has slurred speech. Pink slipped by PD PTA  HPI    Nursing Notes were reviewed. REVIEW OF SYSTEMS    (2-9 systems for level 4, 10 or more for level 5)     Review of Systems   Constitutional:  Negative for fever. Gastrointestinal:  Negative for vomiting. Psychiatric/Behavioral:          Gabapentin use  depression   All other systems reviewed and are negative. Except as noted above the remainder of the review of systems was reviewed and negative.        PAST MEDICAL HISTORY     Past Medical History:   Diagnosis Date    ADHD     Bipolar depression ([facility]     Schizoaffective disorder ([facility] [address]          SURGICAL HISTORY       Past Surgical History:   Procedure Laterality Date    LEEP      NASAL SEPTUM SURGERY           CURRENT MEDICATIONS       Previous Medications    ARIPIPRAZOLE (ABILIFY) 5 MG TABLET    Take 1 tablet by mouth daily    GABAPENTIN (NEURONTIN) 300 MG CAPSULE    Take 600 mg by mouth 2 times daily. MELATONIN 10 MG TABS    Take 1 tablet by mouth nightly       ALLERGIES     Patient has no known allergies. FAMILY HISTORY       Family History   Adopted: Yes          SOCIAL HISTORY       Social History     Socioeconomic History    Marital status:    Tobacco Use    Smoking status: Every Day     Packs/day: 0.50     Types: Cigarettes    Smokeless tobacco: Never   Substance and Sexual Activity    Alcohol use: Yes     Comment: \"a couple drinks (wine coolers) every other day\"    Drug use: Yes     Types: Marijuana (Weed)       SCREENINGS         Ezequiel Coma Scale  Eye Opening: Spontaneous  Best Verbal Response: Oriented  Best Motor Response: Obeys commands  Monett Coma Scale Score: 15                     CIWA Assessment  BP: 125/71  Heart Rate: 56                 PHYSICAL EXAM    (up to 7 for level 4, 8 or more for level 5)     ED Triage Vitals   BP Temp Temp src Pulse Resp SpO2 Height Weight   -- -- -- -- -- -- -- --       Physical Exam  Vitals reviewed. Constitutional:       Appearance: She is not toxic-appearing or diaphoretic. Comments: No palpable skull fracture, no overt signs of head injury, no subconjunctival hemorrhage, no midline vertebral tenderness or step-off. Pelvis stable. GCS 15. HENT:      Head: Normocephalic and atraumatic. Nose: Nose normal.      Mouth/Throat:      Mouth: Mucous membranes are dry. Eyes:      General: No scleral icterus. Extraocular Movements: Extraocular movements intact. Pupils: Pupils are equal, round, and reactive to light. Comments: Conjunctival injection   Cardiovascular:      Rate and Rhythm: Normal rate and regular rhythm. Pulses: Normal pulses. Pulmonary:      Effort: Pulmonary effort is normal. No respiratory distress. Breath sounds: Normal breath sounds. No wheezing. Abdominal:      General: There is no distension. Tenderness: There is no abdominal tenderness. Musculoskeletal:         General: No swelling or deformity. Normal range of motion. Cervical back: Normal range of motion. No tenderness. Right lower leg: No edema. Left lower leg: No edema. Skin:     Capillary Refill: Capillary refill takes less than 2 seconds. Comments: No lacerations   Neurological:      General: No focal deficit present. Mental Status: She is alert and oriented to person, place, and time. Sensory: No sensory deficit. Motor: No weakness. Psychiatric:      Comments: Depressed. Slurred speech.        DIAGNOSTIC RESULTS     EKG: All EKG's are interpreted by the Emergency Department Physician who either signs or Co-signs this chart in the absence of a cardiologist.    Normal sinus rhythm, rate 64, normal axis, normal intervals, , no STEMI    RADIOLOGY:   Non-plain film images such as CT, Ultrasound and MRI are read by the radiologist. Plain radiographic images are visualized and preliminarily interpreted by the emergency physician with the below findings:    Interpretation per the Radiologist below, if available at the time of this note:    No orders to display         ED BEDSIDE ULTRASOUND:   Performed by ED Physician - none    LABS:  Labs Reviewed   COMPREHENSIVE METABOLIC PANEL W/ REFLEX TO MG FOR LOW K - Abnormal; Notable for the following components:       Result Value    Chloride 109 (*)     Albumin 4.7 (*)     All other components within normal limits   ACETAMINOPHEN LEVEL - Abnormal; Notable for the following components:    Acetaminophen Level <5 (*)     All other components within normal limits   SALICYLATE LEVEL - Abnormal; Notable for the following components:    Salicylate, Serum <7.0 (*)     All other components within normal limits   COVID-19, RAPID   CBC WITH AUTO DIFFERENTIAL   ETHANOL   URINE DRUG SCREEN   CK   TSH WITH REFLEX   LIPID PANEL   URINALYSIS WITH REFLEX TO CULTURE   PREGNANCY, URINE   POC PREGNANCY UR-QUAL       All other labs were within normal range or not returned as of this dictation. EMERGENCY DEPARTMENT COURSE and DIFFERENTIAL DIAGNOSIS/MDM:   Vitals:    Vitals:    10/23/22 0400 10/23/22 0407 10/23/22 0625 10/23/22 0642   BP: 95/68 109/68 (!) 83/45 125/71   Pulse: 50 68 (!) 46 56   Resp: 11 17 14    Temp:   97.7 °F (36.5 °C)    TempSrc:       SpO2: 100% 99% 100% 100%   Weight:       Height:           Nurse spoke to poison control who states patient needs to be monitored for minimum 6 hours  MDM  Number of Diagnoses or Management Options  Bipolar depression ([facility] [address]  Gabapentin overdose, undetermined intent, initial encounter  Diagnosis management comments: Plan to admit to [facility] with dx: bipolar depression          REASSESSMENT      2135 patient awakens to noxious stimuli only, states her name, date, follows simple commands. Protecting airway. Saturating well on room air. 0214 patient now awakens to voice, still has slurred speech, however clinically is improving    0323 no longer slurring words, ambulatory w/o assistance, tolerating PO  MEDICALLY CLEAR    CONSULTS:  None    PROCEDURES:  Unless otherwise noted below, none     Procedures        FINAL IMPRESSION      1. Bipolar depression ([facility] [address]    2. Gabapentin overdose, undetermined intent, initial encounter          DISPOSITION/PLAN   DISPOSITION Decision To Admit 10/23/2022 12:36:31 PM      PATIENT REFERRED TO:  No follow-up provider specified. DISCHARGE MEDICATIONS:  New Prescriptions    No medications on file     Controlled Substances Monitoring:     No flowsheet data found.     (Please note that portions of this note were completed with a voice recognition program.  Efforts were made to edit the dictations but occasionally words are mis-transcribed.)    Karthik Urban MD (electronically signed)  Attending Emergency Physician            Karthik Urban MD  10/23/22 1834

## 2022-10-23 NOTE — ED NOTES
Patient awakens to verbal stimuli, but falls right back to sleep. Medicated as ordered per dr. Ed Alford.      Wicho Salas RN  10/23/22 0195

## 2022-10-24 LAB
EKG ATRIAL RATE: 64 BPM
EKG P AXIS: 58 DEGREES
EKG P-R INTERVAL: 164 MS
EKG Q-T INTERVAL: 422 MS
EKG QRS DURATION: 88 MS
EKG QTC CALCULATION (BAZETT): 435 MS
EKG R AXIS: 73 DEGREES
EKG T AXIS: 56 DEGREES
EKG VENTRICULAR RATE: 64 BPM

## 2022-10-24 PROCEDURE — 1240000000 HC EMOTIONAL WELLNESS R&B

## 2022-10-24 PROCEDURE — 99223 1ST HOSP IP/OBS HIGH 75: CPT | Performed by: PSYCHIATRY & NEUROLOGY

## 2022-10-24 PROCEDURE — 6370000000 HC RX 637 (ALT 250 FOR IP): Performed by: PSYCHIATRY & NEUROLOGY

## 2022-10-24 NOTE — PROGRESS NOTES
Patient denies SI/HI or AVH. Patient is isolating to room this am.  Patient seen out at the phone. Patient does eat breakfast.  Patient c/o being tired with a flat sad affect. Patient states she is not sleeping well. Anx and dep 6-7/10. Patient has a nicotine patch which she does put on this am.  Patient will be monitored on the unit and encouraged group participation. Electronically signed by Yair Hill LPN on 29/04/9062 at 10:01 AM    Pt encouraged groups patient declines as she states \"I am too tired. \"  Pt is laying in bed.  Electronically signed by Yair Hill LPN on 98/84/5866 at 10:17 AM

## 2022-10-24 NOTE — H&P
[address] [place] [address] Department of Psychiatry    History and Physical - Adult         CHIEF COMPLAINT:  Depression SA    History obtained from:  patient    Patient was seen after discussing with the treatment team and reviewing the chart      HISTORY OF PRESENT ILLNESS:      The patient is a 27 y.o. female single was living with BF, 8year old son work   significant past history of bipolar disorder  Recent admit to [facility] in July  Pt see psychiatrist at [address] [place] [address] presented to ED after taking a reported 90 300mg Gabapentin. She reports she told her sister, who had her come to Urgent care then ED. Pt report that she is so stressed out overwhelmed  Relationship with BF not going well, fighting eviction, work is stressful    Has been feeling depressed  Minimizing the seriousness of SA  Pt report that she wanted to go to sleep and wake up next day  Sister called her randomly who forced her to go to ER  Pt is upset about the whole sequence    Severity: Rating mood to be around 3/10 (10- good)  Quality:melancholic  Content: Hopeless, worthless and helpless feeling  Suicidal thoughts - minimizng the suicidal attempt  Associated symptoms:  Poor concentration, anhedonia, decrease motivation  Sleep- increase and appetite- good      The patient is currently receiving care for the above psychiatric illness. Medications Prior to Admission:   Medications Prior to Admission: gabapentin (NEURONTIN) 300 MG capsule, Take 600 mg by mouth 2 times daily.   ARIPiprazole (ABILIFY) 5 MG tablet, Take 1 tablet by mouth daily (Patient not taking: Reported on 10/23/2022)  Melatonin 10 MG TABS, Take 1 tablet by mouth nightly (Patient not taking: Reported on 10/23/2022)    Compliance:not taking any medication other than neurontin for anxiety    Psychiatric Review of Systems       Depression: YES     Vicenta or Hypomania:  yes      Panic Attacks:  no     Phobias:  no     Obsessions and Compulsions:  no     PTSD : no     Hallucinations: no     Delusions:  no    Substance Abuse History:  ETOH: 2 times a week, 2 drinks  Marijuana: no  Opiates: no  Other Drugs: no      Past Psychiatric History:  Prior Diagnosis:  Bipolar depression, Borderline PD  Psychiatrist: [facility]  Therapist:yes  Hospitalization: yes  Hx of Suicidal Attempts: yes- cut self in the past  Hx of violence:  no  ECT: no  Previous discontinued Psychiatric Med Trials: not recall    Past Medical History:        Diagnosis Date    ADHD     Bipolar depression ([facility]     PTSD (post-traumatic stress disorder)     Schizoaffective disorder ([facility]        Past Surgical History:        Procedure Laterality Date    LEEP      NASAL SEPTUM SURGERY         Allergies:   Patient has no known allergies. Family History  Family History   Adopted: Yes         Social History:  Born [place] Raised: [place]  Describes Childhood:   supportive  Education: [employer]  Employment: Employed full time  Relationships: single  Children: 1  Current Support:  poor     Legal Hx: none  Access to weapons?:  No      EXAMINATION:    REVIEW OF SYSTEMS:    ROS:  [x] All negative/unchanged except if checked.  Explain positive(checked items) below:  [] Constitutional  [] Eyes  [] Ear/Nose/Mouth/Throat  [] Respiratory  [] CV  [] GI  []   [] Musculoskeletal  [] Skin/Breast  [] Neurological  [] Endocrine  [] Heme/Lymph  [] Allergic/Immunologic    Explanation:     Vitals:  /64   Pulse 61   Temp 98.2 °F (36.8 °C) (Oral)   Resp 16   Ht 5' 8\" (1.727 m)   Wt 194 lb (88 kg)   LMP 10/16/2022   SpO2 98%   BMI 29.50 kg/m²      Neurologic Exam:   Muscle Strength & Tone: full ROM  Gait: normal gait   Involuntary Movements: No    Mental Status Examination:    Level of consciousness:  within normal limits   Appearance:  ill-appearing  Behavior/Motor:  psychomotor retardation  Attitude toward examiner:  cooperative  Speech:  slow   Mood: constricted, decreased range and depressed  Affect:  mood congruent  Thought processes: slow   Association:  Thought content:  Suicidal Ideation:  minimizing  Delusions:  no evidence of delusions  Perceptual Disturbance:  denies any perceptual disturbance  Cognition:  oriented to person, place, and time   Attention & Concentration distractible  Memory intact  Insight poor   Judgement fair   Fund of Knowledge adequate    Mini Mental Status 30/30      DIAGNOSIS:     Bipolar depression   Borderline PD        RISK ASSESSMENT:    SUICIDE RISK ASSESSMENT: high risk impulsivity  HOMICIDE: low  AGITATION/VIOLENCE: low  ELOPEMENT: low    LABS: REVIEWED TODAY:  Recent Labs     10/22/22  2059   WBC 7.0   HGB 13.5        Recent Labs     10/22/22  2059      K 4.1   *   CO2 23   BUN 9   CREATININE 0.66   GLUCOSE 86     Recent Labs     10/22/22  2059   BILITOT <0.2   ALKPHOS 46   AST 16   ALT 15     Lab Results   Component Value Date/Time    LABAMPH Neg 10/22/2022 11:14 PM    BARBSCNU Neg 10/22/2022 11:14 PM    LABBENZ Neg 10/22/2022 11:14 PM    LABMETH Neg 10/22/2022 11:14 PM    OPIATESCREENURINE Neg 10/22/2022 11:14 PM    PHENCYCLIDINESCREENURINE Neg 10/22/2022 11:14 PM    ETOH 24 10/22/2022 08:59 PM     No results found for: TSH, FREET4  No results found for: LITHIUM  No results found for: VALPROATE, CBMZ  No results found for: LITHIUM, VALPROATE    FURTHER LABS ORDERED :      Radiology   No results found. EKG: TRACING REVIEWED    TREATMENT PLAN:    Risk Management:  routine    This patient was assessed for Medical bed necessity for the following reason:  N/A    Collateral Information:  Will obtain collateral information from the family or friends. Will obtain medical records as appropriate from out patient providers  Will consult the hospitalist for a physical exam to rule out any co-morbid physical condition. Home medication Reconciled       New Medications started during this admission :    See orders  Prn Haldol 5mg and Vistaril 50mg q6hr for extreme agitation.   Trazodone as ordered for insomnia  Vistaril as ordered for anxiety  Discussed with the patient risk, benefit, alternative and common side effects for the  proposed medication treatment. Patient is consenting to the treatment.     Psychotherapy:   Encourage participation in milieu and group therapy  Individual therapy as needed      Electronically signed by Cecilia Frederick MD on 10/24/2022 at 2:36 PM

## 2022-10-24 NOTE — CONSULTS
Consult Note            Date:10/24/2022        Patient Name:Chitra Valdez     YOB: 1990     Age:32 y.o. Reason for Consult:  medical H and P    Chief Complaint     Chief Complaint   Patient presents with    Drug Overdose     On gabapentin           History Obtained From   patient    History of Present Illness   24-year-old female with significant past medical history of bipolar depression, schizoaffective disorder, ADHD who was admitted to inpatient psychiatric unit after taking  mg tablets of Neurontin. Patient states that she just wanted to go to sleep, she was trying to offload her stress and felt overwhelmed. She called her sister and said she took Neurontin and was going to sleep. She states \"I knew I would wake up \"she denies any fevers or chills. She denies headache or congestion. Denies chest pain, shortness of breath or palpitations. Denies abdominal pain, nausea vomiting constipation or diarrhea. Denies difficulty urinating. Thyroid-stimulating hormone 1.320. Glucose normal at 86. Urine toxicology screen negative. Past Medical History     Past Medical History:   Diagnosis Date    ADHD     Bipolar depression ([facility] [address]     PTSD (post-traumatic stress disorder)     Schizoaffective disorder ([facility] [address]         Past Surgical History     Past Surgical History:   Procedure Laterality Date    LEEP      NASAL SEPTUM SURGERY          Medications     Prior to Admission medications    Medication Sig Start Date End Date Taking? Authorizing Provider   gabapentin (NEURONTIN) 300 MG capsule Take 600 mg by mouth 2 times daily.  5/26/22  Yes Historical Provider, MD   ARIPiprazole (ABILIFY) 5 MG tablet Take 1 tablet by mouth daily  Patient not taking: Reported on 10/23/2022 7/7/22   Alyssa FriMD judy   Melatonin 10 MG TABS Take 1 tablet by mouth nightly  Patient not taking: Reported on 10/23/2022 5/26/22   Historical Provider, MD        haloperidol lactate (HALDOL) injection 5 mg, Q6H PRN and rhythm, normal S1 and S2, no S3 or S4, and no murmur noted  ABDOMEN:  normal bowel sounds  MUSCULOSKELETAL:  there is no redness, warmth, or swelling of the joints  NEUROLOGIC:  Awake, alert, oriented to name, place and time. Cranial nerves II-XII are grossly intact. Motor is 5 out of 5 bilaterally. Cerebellar finger to nose, heel to shin intact. Sensory is intact. Babinski down going, Romberg negative, and gait is normal.  SKIN:  normal skin color, texture, turgor    Labs    CBC:  Recent Labs     10/22/22  2059   WBC 7.0   RBC 4.52   HGB 13.5   HCT 39.7   MCV 87.8   RDW 13.5        CHEMISTRIES:  Recent Labs     10/22/22  2059      K 4.1   *   CO2 23   BUN 9   CREATININE 0.66   GLUCOSE 86     PT/INR:No results for input(s): PROTIME, INR in the last 72 hours. APTT:No results for input(s): APTT in the last 72 hours. LIVER PROFILE:  Recent Labs     10/22/22  2059   AST 16   ALT 15   BILITOT <0.2   ALKPHOS 46       Imaging/Diagnostics   No results found. Assessment      Hospital Problems             Last Modified POA    * (Principal) Bipolar depression ([facility] [address] 10/23/2022 Yes   Intentional overdose on Neurontin  NICOTINE DEPENDENCE  Plan   1. Admitted to inpatient psychiatric unit  2. Patient has no underlying medical history, no CNS depression  3.  NICOTINE PATCH  4.  Medicine will sign off    Electronically signed by JUSTIN Mahajan on 10/24/22 at 9:49 AM EDT

## 2022-10-24 NOTE — PROGRESS NOTES
Pt. refused to attend the 1000 skills group, despite staff encouragement.  Electronically signed by Denys Hoffmann, [address] on 10/24/2022 at 2:30 PM

## 2022-10-24 NOTE — PLAN OF CARE
Problem: Anxiety  Goal: Will report anxiety at manageable levels  Description: INTERVENTIONS:  1. Administer medication as ordered  2. Teach and rehearse alternative coping skills  3. Provide emotional support with 1:1 interaction with staff  10/23/2022 1455 by Jhony Cortez RN  Outcome: Progressing     Problem: Coping  Goal: Pt/Family able to verbalize concerns and demonstrate effective coping strategies  Description: INTERVENTIONS:  1. Assist patient/family to identify coping skills, available support systems and cultural and spiritual values  2. Provide emotional support, including active listening and acknowledgement of concerns of patient and caregivers  3. Reduce environmental stimuli, as able  4. Instruct patient/family in relaxation techniques, as appropriate  5. Assess for spiritual pain/suffering and initiate Spiritual Care, Psychosocial Clinical Specialist consults as needed  10/23/2022 1455 by Jhony Cortez RN  Outcome: Progressing     Problem: Depression/Self Harm  Goal: Effect of psychiatric condition will be minimized and patient will be protected from self harm  Description: INTERVENTIONS:  1. Assess impact of patient's symptoms on level of functioning, self care needs and offer support as indicated  2. Assess patient/family knowledge of depression, impact on illness and need for teaching  3. Provide emotional support, presence and reassurance  4. Assess for possible suicidal thoughts or ideation. If patient expresses suicidal thoughts or statements do not leave alone, initiate Suicide Precautions, move to a room close to the nursing station and obtain sitter  5.  Initiate consults as appropriate with Mental Health Professional, Spiritual Care, Psychosocial CNS, and consider a recommendation to the LIP for a Psychiatric Consultation  10/23/2022 1455 by Jhony Cortez RN  Outcome: Progressing     Problem: Involuntary Admit  Goal: Will cooperate with staff recommendations and doctor's orders and will demonstrate appropriate behavior  Description: INTERVENTIONS:  1. Treat underlying conditions and offer medication as ordered  2. Educate regarding involuntary admission procedures and rules  3.  Contain excessive/inappropriate behavior per unit and hospital policies  [id number]/4100 [id number] by Leopold Peper, RN  Outcome: Progressing

## 2022-10-24 NOTE — CARE COORDINATION
Brief Intervention and Referral to Treatment Summary    Patient was provided PHQ-9, AUDIT-C and DAST Screening:      PHQ-9 Score: 16  AUDIT-C Score: 0  DAST Score: 0     Patients substance use is considered     Low Risk/Healthy x  Moderate Risk  Harmful  Dependent    Patients depression is considered:     Minimal  Mild   Moderate x  Moderately Severe  Severe    Brief Education Was Provided N/A    Patient was receptive  Patient was not receptive      Brief Intervention Is Provided (Only for AUDIT-C or DAST) N/A    Patient reports readiness to decrease and/or stop use and a plan was discussed   Patient denies readiness to decrease and/or stop use and a plan was not discussed      Recommendations/Referrals for Brief and/or Specialized Treatment Provided to Patient    Patient denied any current abuse of drugs or alcohol. As a result, no brief education or intervention was completed.     Electronically signed by Fany [address] on 10/24/2022 at 12:37 PM

## 2022-10-24 NOTE — CARE COORDINATION
BHI Biopsychosocial Assessment    Current Level of Psychosocial Functioning     Independent   Dependent    Minimal Assist x    Comments:  Patient is employed. She lives with her boyfriend and her son. She receives medicaid for insurance and is reapplying for food stamps. Psychosocial High Risk Factors (check all that apply)    Unable to obtain meds   Chronic illness/pain    Substance abuse   Lack of Family Support   Financial stress   Isolation   Inadequate Community Resources  Suicide attempt(s)  Not taking medications x  Victim of crime   Developmental Delay  Unable to manage personal needs    Age 72 or older   Homeless  No transportation   Readmission within 30 days  Unemployment  Traumatic Event    Comments: Patient has at least one high risk factor associated with this admission. Psychiatric Advanced Directives: None Reported. Family to Involve in Treatment: Patient provided her boyfriend's contact information to complete collateral. Romario Akbar [phone]. Sexual Orientation:  Patient is currently in a heterosexual relationship. Patient Strengths: Patient was cooperative and engaging. Patient Barriers: None Identified. Opiate Education Provided:  N/A    CMHC/mental health history: Patient is a client at the [facility]. Plan of Care   medication management, group/individual therapies, family meetings, psycho -education, treatment team meetings to assist with stabilization    Initial Discharge Plan:  Patient will return home and follow the recommendations of the treatment team.       Clinical Summary:    Patient is a 27year old female who was admitted to the [facility] due to a drug overdose. Reportedly, patient stated she was not making a self harm attempt but her sister took patients' actions seriously and brought her to urgent care. When interviewed, patient presented as depressed and lethargic. Her speech was soft and slow.  Patient presented as agitated when asked to speak louder and/or to repeat her response. Patient is client at the [facility]. She stated she stopped taking her medication because it was not working. Patient was able to complete a safety plan and intends to continue services at the [facility].      Electronically signed by Marian Santos on 10/24/2022 at 12:45 PM

## 2022-10-24 NOTE — PLAN OF CARE
Patient is alert and oriented x 4, she walks independently with a steady gait. Patient has no physical complaints at this time. She is out but non-social with peers. Appears flat and depressed. Problem: Anxiety  Goal: Will report anxiety at manageable levels  Description: INTERVENTIONS:  1. Administer medication as ordered  2. Teach and rehearse alternative coping skills  3. Provide emotional support with 1:1 interaction with staff  Outcome: Progressing  Flowsheets (Taken 10/24/2022 1224)  Will report anxiety at manageable levels:   Administer medication as ordered   Teach and rehearse alternative coping skills   Provide emotional support with 1:1 interaction with staff     Problem: Coping  Goal: Pt/Family able to verbalize concerns and demonstrate effective coping strategies  Description: INTERVENTIONS:  1. Assist patient/family to identify coping skills, available support systems and cultural and spiritual values  2. Provide emotional support, including active listening and acknowledgement of concerns of patient and caregivers  3. Reduce environmental stimuli, as able  4. Instruct patient/family in relaxation techniques, as appropriate  5.  Assess for spiritual pain/suffering and initiate Spiritual Care, Psychosocial Clinical Specialist consults as needed  Outcome: Progressing  Flowsheets (Taken 10/24/2022 1224)  Patient/family able to verbalize anxieties, fears, and concerns, and demonstrate effective coping:   Assist patient/family to identify coping skills, available support systems and cultural and spiritual values   Provide emotional support, including active listening and acknowledgement of concerns of patient and caregivers   Reduce environmental stimuli, as able   Instruct patient/family in relaxation techniques, as appropriate   Assess for spiritual pain/suffering and initiate Spiritual Care, Psychosocial Clinical Specialist consults as needed     Problem: Depression/Self Harm  Goal: Effect of psychiatric condition will be minimized and patient will be protected from self harm  Description: INTERVENTIONS:  1. Assess impact of patient's symptoms on level of functioning, self care needs and offer support as indicated  2. Assess patient/family knowledge of depression, impact on illness and need for teaching  3. Provide emotional support, presence and reassurance  4. Assess for possible suicidal thoughts or ideation. If patient expresses suicidal thoughts or statements do not leave alone, initiate Suicide Precautions, move to a room close to the nursing station and obtain sitter  5. Initiate consults as appropriate with Mental Health Professional, Spiritual Care, Psychosocial CNS, and consider a recommendation to the LIP for a Psychiatric Consultation  Outcome: Progressing  Flowsheets (Taken 10/24/2022 1224)  Effect of psychiatric condition will be minimized and patient will be protected from self harm:   Assess patient/family knowledge of depression, impact on illness and need for teaching   Assess impact of patients symptoms on level of functioning, self care needs and offer support as indicated   Provide emotional support, presence and reassurance   Assess for suicidal thoughts or ideation. If patient expresses suicidal thoughts or statements do not leave alone, initiate Suicide Precautions, move near nurse station, obtain sitter   Initiate consults as appropriate with Mental Health Professional, Spiritual Care, Psychosocial CNS, and consider a recommendation to the LIP for a Psychiatric Consultation     Problem: Involuntary Admit  Goal: Will cooperate with staff recommendations and doctor's orders and will demonstrate appropriate behavior  Description: INTERVENTIONS:  1. Treat underlying conditions and offer medication as ordered  2. Educate regarding involuntary admission procedures and rules  3.  Contain excessive/inappropriate behavior per unit and hospital policies  Outcome: Progressing  Flowsheets (Taken 10/24/2022 1224)  Will cooperate with staff recommendations and doctor's orders and will demonstrate appropriate behavior:   Treat underlying conditions and offer medication as ordered   Educate regarding involuntary admission procedures and rules   Contain excessive/inappropriate behavior per unit and hospital policies

## 2022-10-24 NOTE — CARE COORDINATION
Group Therapy Note    Date: 10/24/2022  Start Time: 1430  End Time:  1500    Number of Participants: 4    Type of Group: Cognitive Skills    Patient's Goal:  To participate in mood management group. Notes: Patient declined to attend psychoeducation group at 1430 despite encouragement by staff.      Discipline Responsible: /Counselor    CLIFTON Madera

## 2022-10-25 LAB
ALBUMIN SERPL-MCNC: 3.8 G/DL (ref 3.5–4.6)
ALP BLD-CCNC: 45 U/L (ref 40–130)
ALT SERPL-CCNC: 16 U/L (ref 0–33)
ANION GAP SERPL CALCULATED.3IONS-SCNC: 9 MEQ/L (ref 9–15)
AST SERPL-CCNC: 14 U/L (ref 0–35)
BILIRUB SERPL-MCNC: 0.6 MG/DL (ref 0.2–0.7)
BUN BLDV-MCNC: 9 MG/DL (ref 6–20)
CALCIUM SERPL-MCNC: 8.8 MG/DL (ref 8.5–9.9)
CHLORIDE BLD-SCNC: 105 MEQ/L (ref 95–107)
CO2: 26 MEQ/L (ref 20–31)
CREAT SERPL-MCNC: 0.67 MG/DL (ref 0.5–0.9)
GFR SERPL CREATININE-BSD FRML MDRD: >60 ML/MIN/{1.73_M2}
GLOBULIN: 2.5 G/DL (ref 2.3–3.5)
GLUCOSE BLD-MCNC: 81 MG/DL (ref 70–99)
POTASSIUM SERPL-SCNC: 3.8 MEQ/L (ref 3.4–4.9)
SODIUM BLD-SCNC: 140 MEQ/L (ref 135–144)
TOTAL PROTEIN: 6.3 G/DL (ref 6.3–8)

## 2022-10-25 PROCEDURE — 99233 SBSQ HOSP IP/OBS HIGH 50: CPT | Performed by: PSYCHIATRY & NEUROLOGY

## 2022-10-25 PROCEDURE — 6370000000 HC RX 637 (ALT 250 FOR IP): Performed by: PSYCHIATRY & NEUROLOGY

## 2022-10-25 PROCEDURE — 36415 COLL VENOUS BLD VENIPUNCTURE: CPT

## 2022-10-25 PROCEDURE — 1240000000 HC EMOTIONAL WELLNESS R&B

## 2022-10-25 PROCEDURE — 80053 COMPREHEN METABOLIC PANEL: CPT

## 2022-10-25 RX ORDER — DIVALPROEX SODIUM 250 MG/1
250 TABLET, DELAYED RELEASE ORAL EVERY 8 HOURS SCHEDULED
Status: DISCONTINUED | OUTPATIENT
Start: 2022-10-25 | End: 2022-10-28 | Stop reason: HOSPADM

## 2022-10-25 RX ADMIN — DIVALPROEX SODIUM 250 MG: 250 TABLET, DELAYED RELEASE ORAL at 21:31

## 2022-10-25 RX ADMIN — DIVALPROEX SODIUM 250 MG: 250 TABLET, DELAYED RELEASE ORAL at 14:46

## 2022-10-25 NOTE — PROGRESS NOTES
Pt. refused to attend the 1000 skills group, despite staff encouragement.  Electronically signed by Ja [address] on 10/25/2022 at 11:37 AM

## 2022-10-25 NOTE — PROGRESS NOTES
Pt. declined to attend the 0900 community meeting, despite staff encouragement.  Goal - \"Get through the day\" Electronically signed by Rocio Vergara, [address] on 10/25/2022 at 11:36 AM

## 2022-10-25 NOTE — PLAN OF CARE
Patient is isolating to her room this evening. Flat/sad affect. Pt rates her anxiety 7/10. PT states she always has anxiety. Rated her depression 10/10. Pt denies SI/HI and AVH. Problem: Anxiety  Goal: Will report anxiety at manageable levels  Description: INTERVENTIONS:  1. Administer medication as ordered  2. Teach and rehearse alternative coping skills  3. Provide emotional support with 1:1 interaction with staff  10/24/2022 2038 by Tucker Laguerre RN  Outcome: Progressing  10/24/2022 1228 by Zahra Mcdonald RN  Outcome: Progressing  Flowsheets (Taken 10/24/2022 1224)  Will report anxiety at manageable levels:   Administer medication as ordered   Teach and rehearse alternative coping skills   Provide emotional support with 1:1 interaction with staff     Problem: Coping  Goal: Pt/Family able to verbalize concerns and demonstrate effective coping strategies  Description: INTERVENTIONS:  1. Assist patient/family to identify coping skills, available support systems and cultural and spiritual values  2. Provide emotional support, including active listening and acknowledgement of concerns of patient and caregivers  3. Reduce environmental stimuli, as able  4. Instruct patient/family in relaxation techniques, as appropriate  5.  Assess for spiritual pain/suffering and initiate Spiritual Care, Psychosocial Clinical Specialist consults as needed  10/24/2022 2038 by Tucker Laguerre RN  Outcome: Progressing  10/24/2022 1228 by Zahra Mcdonald RN  Outcome: Progressing  Flowsheets (Taken 10/24/2022 1224)  Patient/family able to verbalize anxieties, fears, and concerns, and demonstrate effective coping:   Assist patient/family to identify coping skills, available support systems and cultural and spiritual values   Provide emotional support, including active listening and acknowledgement of concerns of patient and caregivers   Reduce environmental stimuli, as able   Instruct patient/family in relaxation techniques, as appropriate   Assess for spiritual pain/suffering and initiate Spiritual Care, Psychosocial Clinical Specialist consults as needed     Problem: Depression/Self Harm  Goal: Effect of psychiatric condition will be minimized and patient will be protected from self harm  Description: INTERVENTIONS:  1. Assess impact of patient's symptoms on level of functioning, self care needs and offer support as indicated  2. Assess patient/family knowledge of depression, impact on illness and need for teaching  3. Provide emotional support, presence and reassurance  4. Assess for possible suicidal thoughts or ideation. If patient expresses suicidal thoughts or statements do not leave alone, initiate Suicide Precautions, move to a room close to the nursing station and obtain sitter  5. Initiate consults as appropriate with Mental Health Professional, Spiritual Care, Psychosocial CNS, and consider a recommendation to the LIP for a Psychiatric Consultation  10/24/2022 2038 by Vu Vigil RN  Outcome: Progressing  Flowsheets (Taken 10/24/2022 2038)  Effect of psychiatric condition will be minimized and patient will be protected from self harm:   Assess impact of patients symptoms on level of functioning, self care needs and offer support as indicated   Assess patient/family knowledge of depression, impact on illness and need for teaching   Provide emotional support, presence and reassurance   Assess for suicidal thoughts or ideation.  If patient expresses suicidal thoughts or statements do not leave alone, initiate Suicide Precautions, move near nurse station, obtain sitter   Initiate consults as appropriate with Mental Health Professional, Spiritual Care, Psychosocial CNS, and consider a recommendation to the LIP for a Psychiatric Consultation  10/24/2022 1228 by Joselo Mari RN  Outcome: Progressing  Flowsheets (Taken 10/24/2022 1224)  Effect of psychiatric condition will be minimized and patient will be protected from self harm:   Assess patient/family knowledge of depression, impact on illness and need for teaching   Assess impact of patients symptoms on level of functioning, self care needs and offer support as indicated   Provide emotional support, presence and reassurance   Assess for suicidal thoughts or ideation. If patient expresses suicidal thoughts or statements do not leave alone, initiate Suicide Precautions, move near nurse station, obtain sitter   Initiate consults as appropriate with Mental Health Professional, Spiritual Care, Psychosocial CNS, and consider a recommendation to the LIP for a Psychiatric Consultation     Problem: Involuntary Admit  Goal: Will cooperate with staff recommendations and doctor's orders and will demonstrate appropriate behavior  Description: INTERVENTIONS:  1. Treat underlying conditions and offer medication as ordered  2. Educate regarding involuntary admission procedures and rules  3.  Contain excessive/inappropriate behavior per unit and hospital policies  [id number] by Richard Redmond RN  Outcome: Progressing  10/24/2022 1228 by Myrna Villa RN  Outcome: Progressing  Flowsheets (Taken 10/24/2022 1224)  Will cooperate with staff recommendations and doctor's orders and will demonstrate appropriate behavior:   Treat underlying conditions and offer medication as ordered   Educate regarding involuntary admission procedures and rules   Contain excessive/inappropriate behavior per unit and hospital policies

## 2022-10-25 NOTE — PLAN OF CARE
Patient is assessed in the day room per her choice. She reports anxiety 0/10 and depression 6/10 on a 10 point scale where 10 is the worse. Patient denies SI, HI, and AVH. She reports BUQ pain 2/10 that feels cramping at times. Just prior to patient complaining of this 2/10 pain, she was on the phone with a heated conversation. Patient is noted walking around not guarding abdomen. She is eating well. She denies additional needs at this time. She has a sad, flat affect. She reports her last BM was 10/24/2022 and it was normal, formed, and brown. Patient isolates to her room, usually. She declines to go to groups saying they aren't her thing. Patient reports good sleep and denies additional needs at this time. Problem: Anxiety  Goal: Will report anxiety at manageable levels  Description: INTERVENTIONS:  1. Administer medication as ordered  2. Teach and rehearse alternative coping skills  3. Provide emotional support with 1:1 interaction with staff  Outcome: Not Progressing  Flowsheets (Taken 10/25/2022 1148)  Will report anxiety at manageable levels:   Administer medication as ordered   Teach and rehearse alternative coping skills   Provide emotional support with 1:1 interaction with staff     Problem: Coping  Goal: Pt/Family able to verbalize concerns and demonstrate effective coping strategies  Description: INTERVENTIONS:  1. Assist patient/family to identify coping skills, available support systems and cultural and spiritual values  2. Provide emotional support, including active listening and acknowledgement of concerns of patient and caregivers  3. Reduce environmental stimuli, as able  4. Instruct patient/family in relaxation techniques, as appropriate  5.  Assess for spiritual pain/suffering and initiate Spiritual Care, Psychosocial Clinical Specialist consults as needed  Outcome: Not Progressing  Flowsheets (Taken 10/25/2022 1148)  Patient/family able to verbalize anxieties, fears, and concerns, and demonstrate effective coping:   Assist patient/family to identify coping skills, available support systems and cultural and spiritual values   Provide emotional support, including active listening and acknowledgement of concerns of patient and caregivers   Instruct patient/family in relaxation techniques, as appropriate   Reduce environmental stimuli, as able     Problem: Depression/Self Harm  Goal: Effect of psychiatric condition will be minimized and patient will be protected from self harm  Description: INTERVENTIONS:  1. Assess impact of patient's symptoms on level of functioning, self care needs and offer support as indicated  2. Assess patient/family knowledge of depression, impact on illness and need for teaching  3. Provide emotional support, presence and reassurance  4. Assess for possible suicidal thoughts or ideation. If patient expresses suicidal thoughts or statements do not leave alone, initiate Suicide Precautions, move to a room close to the nursing station and obtain sitter  5. Initiate consults as appropriate with Mental Health Professional, Spiritual Care, Psychosocial CNS, and consider a recommendation to the LIP for a Psychiatric Consultation  Outcome: Not Progressing  Flowsheets  Taken 10/25/2022 1156  Effect of psychiatric condition will be minimized and patient will be protected from self harm:   Assess impact of patients symptoms on level of functioning, self care needs and offer support as indicated   Assess patient/family knowledge of depression, impact on illness and need for teaching   Provide emotional support, presence and reassurance   Assess for suicidal thoughts or ideation.  If patient expresses suicidal thoughts or statements do not leave alone, initiate Suicide Precautions, move near nurse station, obtain sitter  Taken 10/25/2022 1148  Effect of psychiatric condition will be minimized and patient will be protected from self harm:   Assess impact of patients symptoms on level of functioning, self care needs and offer support as indicated   Assess patient/family knowledge of depression, impact on illness and need for teaching   Provide emotional support, presence and reassurance   Assess for suicidal thoughts or ideation. If patient expresses suicidal thoughts or statements do not leave alone, initiate Suicide Precautions, move near nurse station, obtain sitter     Problem: Involuntary Admit  Goal: Will cooperate with staff recommendations and doctor's orders and will demonstrate appropriate behavior  Description: INTERVENTIONS:  1. Treat underlying conditions and offer medication as ordered  2. Educate regarding involuntary admission procedures and rules  3.  Contain excessive/inappropriate behavior per unit and hospital policies  Outcome: Not Progressing  Flowsheets (Taken 10/25/2022 114)  Will cooperate with staff recommendations and doctor's orders and will demonstrate appropriate behavior:   Treat underlying conditions and offer medication as ordered   Educate regarding involuntary admission procedures and rules   Contain excessive/inappropriate behavior per unit and hospital policies

## 2022-10-25 NOTE — PROGRESS NOTES
[address] NOTE       10/25/2022     Patient was seen and examined in person, Chart reviewed   Patient's case discussed with staff/team    Chief Complaint: Depression, ,SI    Interim History:     Pt c/o stomach cramp  BF visited yesterday  Still feel depressed with hopeless and worthless feeling  Has been secluding in her room  Has tried Lamictal in the past  Impulsive tendencies and anger issues are the main symptoms that she is worried about  Passive SI  Want to inform her job about her stay here  Appetite:   [] Normal/Unchanged  [] Increased  [x] Decreased      Sleep:       [] Normal/Unchanged  [] Fair       [x] Poor              Energy:    [] Normal/Unchanged  [] Increased  [x] Decreased        SI [] Present  [] Absent    HI  []Present  [] Absent     Aggression:  [] yes  [] no    Patient is [] able  [] unable to CONTRACT FOR SAFETY     PAST MEDICAL/PSYCHIATRIC HISTORY:   Past Medical History:   Diagnosis Date    ADHD     Bipolar depression ([facility] [address]     PTSD (post-traumatic stress disorder)     Schizoaffective disorder ([facility]        FAMILY/SOCIAL HISTORY:  Family History   Adopted: Yes     Social History     Socioeconomic History    Marital status:      Spouse name: Not on file    Number of children: Not on file    Years of education: Not on file    Highest education level: Not on file   Occupational History    Not on file   Tobacco Use    Smoking status: Every Day     Packs/day: 0.50     Types: Cigarettes    Smokeless tobacco: Not on file   Vaping Use    Vaping Use: Every day   Substance and Sexual Activity    Alcohol use: Yes     Comment: \"a couple drinks (wine coolers) every other day\"    Drug use: Not on file    Sexual activity: Yes   Other Topics Concern    Not on file   Social History Narrative    Not on file     Social Determinants of Health     Financial Resource Strain: Not on file   Food Insecurity: Not on file   Transportation Needs: Not on file   Physical Activity: Not on file   Stress: Not on file   Social Connections: Not on file   Intimate Partner Violence: Not on file   Housing Stability: Not on file           ROS:  [x] All negative/unchanged except if checked.  Explain positive(checked items) below:  [] Constitutional  [] Eyes  [] Ear/Nose/Mouth/Throat  [] Respiratory  [] CV  [] GI  []   [] Musculoskeletal  [] Skin/Breast  [] Neurological  [] Endocrine  [] Heme/Lymph  [] Allergic/Immunologic    Explanation:     MEDICATIONS:    Current Facility-Administered Medications:     haloperidol lactate (HALDOL) injection 5 mg, 5 mg, IntraMUSCular, Q6H PRN **OR** haloperidol (HALDOL) tablet 5 mg, 5 mg, Oral, Q6H PRN, Jazmín Campos MD    hydrOXYzine pamoate (VISTARIL) capsule 50 mg, 50 mg, Oral, Q6H PRN **OR** hydrOXYzine (VISTARIL) injection 50 mg, 50 mg, IntraMUSCular, Q6H PRN, Jazmín Campos MD    acetaminophen (TYLENOL) tablet 650 mg, 650 mg, Oral, Q4H PRN, Jazmín Campos MD    traZODone (DESYREL) tablet 50 mg, 50 mg, Oral, Nightly PRN, Jazmín Campos MD    benztropine mesylate (COGENTIN) injection 2 mg, 2 mg, IntraMUSCular, BID PRN, Jazmín Campos MD    magnesium hydroxide (MILK OF MAGNESIA) 400 MG/5ML suspension 30 mL, 30 mL, Oral, Daily PRN, Jazmín Campos MD    nicotine (NICODERM CQ) 14 MG/24HR 1 patch, 1 patch, TransDERmal, Daily, Jazmín Campos MD, 1 patch at 10/25/22 0825      Examination:  /66   Pulse 69   Temp 98.2 °F (36.8 °C) (Oral)   Resp 18   Ht 5' 8\" (1.727 m)   Wt 194 lb (88 kg)   LMP 10/16/2022   SpO2 99%   BMI 29.50 kg/m²   Gait - steady  Medication side effects(SE): no    Mental Status Examination:    Level of consciousness:  within normal limits   Appearance:  fair grooming and fair hygiene  Behavior/Motor:  psychomotor retardation  Attitude toward examiner:  cooperative  Speech:  slow   Mood: anxious and depressed  Affect:  blunted  Thought processes:  slow   Thought content:  Suicidal Ideation: passive  Cognition:  oriented to person, place, and time   Concentration intact  Insight fair   Judgement fair     ASSESSMENT:   Patient symptoms are:  [] Well controlled  [] Improving  [] Worsening  [] No change      Diagnosis:   Principal Problem:    Bipolar depression ([facility] [address]  Resolved Problems:    * No resolved hospital problems. *      LABS:    Recent Labs     10/22/22  2059   WBC 7.0   HGB 13.5        Recent Labs     10/22/22  2059 10/25/22  0532    140   K 4.1 3.8   * 105   CO2 23 26   BUN 9 9   CREATININE 0.66 0.67   GLUCOSE 86 81     Recent Labs     10/22/22  2059 10/25/22  0532   BILITOT <0.2 0.6   ALKPHOS 46 45   AST 16 14   ALT 15 16     Lab Results   Component Value Date/Time    LABAMPH Neg 10/22/2022 11:14 PM    BARBSCNU Neg 10/22/2022 11:14 PM    LABBENZ Neg 10/22/2022 11:14 PM    LABMETH Neg 10/22/2022 11:14 PM    OPIATESCREENURINE Neg 10/22/2022 11:14 PM    PHENCYCLIDINESCREENURINE Neg 10/22/2022 11:14 PM    ETOH 24 10/22/2022 08:59 PM     No results found for: TSH, FREET4  No results found for: LITHIUM  No results found for: VALPROATE, CBMZ    RISK ASSESSMENT: High risk of impulsive behaviors and suicide    Treatment Plan:  Reviewed current Medications with the patient. Depakote 250 mg started  Risks, benefits, side effects, drug-to-drug interactions and alternatives to treatment were discussed. Collateral information:   CD evaluation  Encourage patient to attend group and other milieu activities.   Discharge planning discussed with the patient and treatment team.    PSYCHOTHERAPY/COUNSELING:  [x] Therapeutic interview  [x] Supportive  [] CBT  [] Ongoing  [] Other    [x] Patient continues to need, on a daily basis, active treatment furnished directly by or requiring the supervision of inpatient psychiatric personnel      Anticipated Length of stay:            Electronically signed by Erik West MD on 10/25/2022 at 12:03 PM

## 2022-10-26 PROCEDURE — 6370000000 HC RX 637 (ALT 250 FOR IP): Performed by: PSYCHIATRY & NEUROLOGY

## 2022-10-26 PROCEDURE — 1240000000 HC EMOTIONAL WELLNESS R&B

## 2022-10-26 PROCEDURE — 99233 SBSQ HOSP IP/OBS HIGH 50: CPT | Performed by: PSYCHIATRY & NEUROLOGY

## 2022-10-26 RX ADMIN — DIVALPROEX SODIUM 250 MG: 250 TABLET, DELAYED RELEASE ORAL at 20:56

## 2022-10-26 RX ADMIN — DIVALPROEX SODIUM 250 MG: 250 TABLET, DELAYED RELEASE ORAL at 08:22

## 2022-10-26 RX ADMIN — DIVALPROEX SODIUM 250 MG: 250 TABLET, DELAYED RELEASE ORAL at 14:23

## 2022-10-26 RX ADMIN — QUETIAPINE FUMARATE 75 MG: 50 TABLET ORAL at 20:56

## 2022-10-26 NOTE — PROGRESS NOTES
Patient denies S/I, H/I and AVH. Patient reported 5/10 anxiety and depression. Patient reported that groups \"increase anxiety\" due to being \"antisocial\". Patient also stated she does not want to talk to her sister because her sister \"is the reason she is here\". Patient would not elaborate further. Patient also reported that she feels her boyfriend increases her anxiety and feels it would be a good idea not to talk to him while she is here. Patient appears guarded and noted to sound agitated when talking about boyfriend and sister. Patient able to make needs known. Will continue to monitor.

## 2022-10-26 NOTE — CARE COORDINATION
Group Therapy Note    Date: 10/26/2022  Start Time: 1100  End Time:  6852    Number of Participants: 4    Type of Group: Psychotherapy    Patient's Goal:  To participate in a goal oriented group. Notes: Patient declined to attend psychoeducation group at 1100 despite encouragement by staff.      Discipline Responsible: /Counselor    CLIFTON Najera

## 2022-10-26 NOTE — PLAN OF CARE
Patient is isolating to her room. Flat/sad affect. Pt denies anxiety. Rates her depression 6/10. Pt denies SI/HI and AVH. Problem: Anxiety  Goal: Will report anxiety at manageable levels  Description: INTERVENTIONS:  1. Administer medication as ordered  2. Teach and rehearse alternative coping skills  3. Provide emotional support with 1:1 interaction with staff  10/25/2022 2107 by Josh Montanez RN  Outcome: Progressing  10/25/2022 1157 by Yonny Whitten RN  Outcome: Not Progressing  Flowsheets (Taken 10/25/2022 1148)  Will report anxiety at manageable levels:   Administer medication as ordered   Teach and rehearse alternative coping skills   Provide emotional support with 1:1 interaction with staff     Problem: Coping  Goal: Pt/Family able to verbalize concerns and demonstrate effective coping strategies  Description: INTERVENTIONS:  1. Assist patient/family to identify coping skills, available support systems and cultural and spiritual values  2. Provide emotional support, including active listening and acknowledgement of concerns of patient and caregivers  3. Reduce environmental stimuli, as able  4. Instruct patient/family in relaxation techniques, as appropriate  5.  Assess for spiritual pain/suffering and initiate Spiritual Care, Psychosocial Clinical Specialist consults as needed  10/25/2022 2107 by Josh Montanez RN  Outcome: Progressing  10/25/2022 1157 by Yonny Whitten RN  Outcome: Not Progressing  Flowsheets (Taken 10/25/2022 1148)  Patient/family able to verbalize anxieties, fears, and concerns, and demonstrate effective coping:   Assist patient/family to identify coping skills, available support systems and cultural and spiritual values   Provide emotional support, including active listening and acknowledgement of concerns of patient and caregivers   Instruct patient/family in relaxation techniques, as appropriate   Reduce environmental stimuli, as able     Problem: Depression/Self Harm  Goal: Effect of psychiatric condition will be minimized and patient will be protected from self harm  Description: INTERVENTIONS:  1. Assess impact of patient's symptoms on level of functioning, self care needs and offer support as indicated  2. Assess patient/family knowledge of depression, impact on illness and need for teaching  3. Provide emotional support, presence and reassurance  4. Assess for possible suicidal thoughts or ideation. If patient expresses suicidal thoughts or statements do not leave alone, initiate Suicide Precautions, move to a room close to the nursing station and obtain sitter  5. Initiate consults as appropriate with Mental Health Professional, Spiritual Care, Psychosocial CNS, and consider a recommendation to the LIP for a Psychiatric Consultation  10/25/2022 2107 by Rosalina Zaidi RN  Outcome: Progressing  Flowsheets (Taken 10/25/2022 2107)  Effect of psychiatric condition will be minimized and patient will be protected from self harm:   Assess impact of patients symptoms on level of functioning, self care needs and offer support as indicated   Assess patient/family knowledge of depression, impact on illness and need for teaching   Provide emotional support, presence and reassurance   Assess for suicidal thoughts or ideation.  If patient expresses suicidal thoughts or statements do not leave alone, initiate Suicide Precautions, move near nurse station, obtain sitter   Initiate consults as appropriate with Mental Health Professional, Spiritual Care, Psychosocial CNS, and consider a recommendation to the LIP for a Psychiatric Consultation  10/25/2022 1157 by Ewa Hall RN  Outcome: Not Progressing  Flowsheets  Taken 10/25/2022 1156  Effect of psychiatric condition will be minimized and patient will be protected from self harm:   Assess impact of patients symptoms on level of functioning, self care needs and offer support as indicated   Assess patient/family knowledge of depression, impact on illness and need for teaching   Provide emotional support, presence and reassurance   Assess for suicidal thoughts or ideation. If patient expresses suicidal thoughts or statements do not leave alone, initiate Suicide Precautions, move near nurse station, obtain sitter  Taken 10/25/2022 1148  Effect of psychiatric condition will be minimized and patient will be protected from self harm:   Assess impact of patients symptoms on level of functioning, self care needs and offer support as indicated   Assess patient/family knowledge of depression, impact on illness and need for teaching   Provide emotional support, presence and reassurance   Assess for suicidal thoughts or ideation. If patient expresses suicidal thoughts or statements do not leave alone, initiate Suicide Precautions, move near nurse station, obtain sitter     Problem: Involuntary Admit  Goal: Will cooperate with staff recommendations and doctor's orders and will demonstrate appropriate behavior  Description: INTERVENTIONS:  1. Treat underlying conditions and offer medication as ordered  2. Educate regarding involuntary admission procedures and rules  3. Contain excessive/inappropriate behavior per unit and hospital policies  [id number] by Milli Mosher RN  Outcome: Progressing  10/25/2022 1157 by Alok Munguia RN  Outcome: Not Progressing  Flowsheets (Taken 10/25/2022 1148)  Will cooperate with staff recommendations and doctor's orders and will demonstrate appropriate behavior:   Treat underlying conditions and offer medication as ordered   Educate regarding involuntary admission procedures and rules   Contain excessive/inappropriate behavior per unit and hospital policies     Problem: Anxiety  Goal: Will report anxiety at manageable levels  Description: INTERVENTIONS:  1. Administer medication as ordered  2. Teach and rehearse alternative coping skills  3.  Provide emotional support with 1:1 interaction with staff  10/25/2022 2107 by Kristin España Van Piper RN  Outcome: Progressing  10/25/2022 1157 by Armani Monreal RN  Outcome: Not Progressing  Flowsheets (Taken 10/25/2022 1148)  Will report anxiety at manageable levels:   Administer medication as ordered   Teach and rehearse alternative coping skills   Provide emotional support with 1:1 interaction with staff     Problem: Coping  Goal: Pt/Family able to verbalize concerns and demonstrate effective coping strategies  Description: INTERVENTIONS:  1. Assist patient/family to identify coping skills, available support systems and cultural and spiritual values  2. Provide emotional support, including active listening and acknowledgement of concerns of patient and caregivers  3. Reduce environmental stimuli, as able  4. Instruct patient/family in relaxation techniques, as appropriate  5. Assess for spiritual pain/suffering and initiate Spiritual Care, Psychosocial Clinical Specialist consults as needed  10/25/2022 2107 by Khoi Gan RN  Outcome: Progressing  10/25/2022 1157 by Armani Monreal RN  Outcome: Not Progressing  Flowsheets (Taken 10/25/2022 1148)  Patient/family able to verbalize anxieties, fears, and concerns, and demonstrate effective coping:   Assist patient/family to identify coping skills, available support systems and cultural and spiritual values   Provide emotional support, including active listening and acknowledgement of concerns of patient and caregivers   Instruct patient/family in relaxation techniques, as appropriate   Reduce environmental stimuli, as able     Problem: Depression/Self Harm  Goal: Effect of psychiatric condition will be minimized and patient will be protected from self harm  Description: INTERVENTIONS:  1. Assess impact of patient's symptoms on level of functioning, self care needs and offer support as indicated  2. Assess patient/family knowledge of depression, impact on illness and need for teaching  3. Provide emotional support, presence and reassurance  4.  Assess for possible suicidal thoughts or ideation. If patient expresses suicidal thoughts or statements do not leave alone, initiate Suicide Precautions, move to a room close to the nursing station and obtain sitter  5. Initiate consults as appropriate with Mental Health Professional, Spiritual Care, Psychosocial CNS, and consider a recommendation to the LIP for a Psychiatric Consultation  10/25/2022 2107 by Milli Mosher RN  Outcome: Progressing  Flowsheets (Taken 10/25/2022 2107)  Effect of psychiatric condition will be minimized and patient will be protected from self harm:   Assess impact of patients symptoms on level of functioning, self care needs and offer support as indicated   Assess patient/family knowledge of depression, impact on illness and need for teaching   Provide emotional support, presence and reassurance   Assess for suicidal thoughts or ideation. If patient expresses suicidal thoughts or statements do not leave alone, initiate Suicide Precautions, move near nurse station, obtain sitter   Initiate consults as appropriate with Mental Health Professional, Spiritual Care, Psychosocial CNS, and consider a recommendation to the LIP for a Psychiatric Consultation  10/25/2022 1157 by Alok Munguia RN  Outcome: Not Progressing  Flowsheets  Taken 10/25/2022 1156  Effect of psychiatric condition will be minimized and patient will be protected from self harm:   Assess impact of patients symptoms on level of functioning, self care needs and offer support as indicated   Assess patient/family knowledge of depression, impact on illness and need for teaching   Provide emotional support, presence and reassurance   Assess for suicidal thoughts or ideation.  If patient expresses suicidal thoughts or statements do not leave alone, initiate Suicide Precautions, move near nurse station, obtain sitter  Taken 10/25/2022 1148  Effect of psychiatric condition will be minimized and patient will be protected from self harm: Assess impact of patients symptoms on level of functioning, self care needs and offer support as indicated   Assess patient/family knowledge of depression, impact on illness and need for teaching   Provide emotional support, presence and reassurance   Assess for suicidal thoughts or ideation. If patient expresses suicidal thoughts or statements do not leave alone, initiate Suicide Precautions, move near nurse station, obtain sitter     Problem: Involuntary Admit  Goal: Will cooperate with staff recommendations and doctor's orders and will demonstrate appropriate behavior  Description: INTERVENTIONS:  1. Treat underlying conditions and offer medication as ordered  2. Educate regarding involuntary admission procedures and rules  3.  Contain excessive/inappropriate behavior per unit and hospital policies  [id number] by Orville Alvarado RN  Outcome: Progressing  10/25/2022 1157 by Ama Napier RN  Outcome: Not Progressing  Flowsheets (Taken 10/25/2022 1148)  Will cooperate with staff recommendations and doctor's orders and will demonstrate appropriate behavior:   Treat underlying conditions and offer medication as ordered   Educate regarding involuntary admission procedures and rules   Contain excessive/inappropriate behavior per unit and hospital policies

## 2022-10-26 NOTE — PLAN OF CARE
Problem: Anxiety  Goal: Will report anxiety at manageable levels  Description: INTERVENTIONS:  1. Administer medication as ordered  2. Teach and rehearse alternative coping skills  3. Provide emotional support with 1:1 interaction with staff  Outcome: Progressing  Flowsheets (Taken 10/26/2022 1017)  Will report anxiety at manageable levels:   Administer medication as ordered   Teach and rehearse alternative coping skills   Provide emotional support with 1:1 interaction with staff     Problem: Coping  Goal: Pt/Family able to verbalize concerns and demonstrate effective coping strategies  Description: INTERVENTIONS:  1. Assist patient/family to identify coping skills, available support systems and cultural and spiritual values  2. Provide emotional support, including active listening and acknowledgement of concerns of patient and caregivers  3. Reduce environmental stimuli, as able  4. Instruct patient/family in relaxation techniques, as appropriate  5. Assess for spiritual pain/suffering and initiate Spiritual Care, Psychosocial Clinical Specialist consults as needed  Outcome: Progressing  Flowsheets (Taken 10/26/2022 1017)  Patient/family able to verbalize anxieties, fears, and concerns, and demonstrate effective coping:   Assist patient/family to identify coping skills, available support systems and cultural and spiritual values   Provide emotional support, including active listening and acknowledgement of concerns of patient and caregivers   Reduce environmental stimuli, as able     Problem: Depression/Self Harm  Goal: Effect of psychiatric condition will be minimized and patient will be protected from self harm  Description: INTERVENTIONS:  1. Assess impact of patient's symptoms on level of functioning, self care needs and offer support as indicated  2. Assess patient/family knowledge of depression, impact on illness and need for teaching  3. Provide emotional support, presence and reassurance  4.  Assess for possible suicidal thoughts or ideation. If patient expresses suicidal thoughts or statements do not leave alone, initiate Suicide Precautions, move to a room close to the nursing station and obtain sitter  5. Initiate consults as appropriate with Mental Health Professional, Spiritual Care, Psychosocial CNS, and consider a recommendation to the LIP for a Psychiatric Consultation  Outcome: Progressing  Flowsheets (Taken 10/26/2022 1017)  Effect of psychiatric condition will be minimized and patient will be protected from self harm:   Assess impact of patients symptoms on level of functioning, self care needs and offer support as indicated   Assess patient/family knowledge of depression, impact on illness and need for teaching   Provide emotional support, presence and reassurance     Problem: Involuntary Admit  Goal: Will cooperate with staff recommendations and doctor's orders and will demonstrate appropriate behavior  Description: INTERVENTIONS:  1. Treat underlying conditions and offer medication as ordered  2. Educate regarding involuntary admission procedures and rules  3.  Contain excessive/inappropriate behavior per unit and hospital policies  Outcome: Progressing  Flowsheets (Taken 10/26/2022 1017)  Will cooperate with staff recommendations and doctor's orders and will demonstrate appropriate behavior:   Treat underlying conditions and offer medication as ordered   Educate regarding involuntary admission procedures and rules   Contain excessive/inappropriate behavior per unit and hospital policies

## 2022-10-26 NOTE — PROGRESS NOTES
[facility]. FOLLOW-UP NOTE       10/26/2022     Patient was seen and examined in person, Chart reviewed   Patient's case discussed with staff/team    Chief Complaint: Depression, ,SI    Interim History:     Pt report that she is facing eviction on Nov 14th - court date  Pt is demanding discharge today- blaming that the hospital will be responsible if she became homeless  Pt want to file for bankruptcy, and apply for ERA  Pt want her BF to be off the lease so that she does not have to be involved  Son live with her uncle- missing school  Pt still has been having racing thoughts  Brother is her main support  Does not want to involve her sister    Appetite:   [] Normal/Unchanged  [] Increased  [x] Decreased      Sleep:       [] Normal/Unchanged  [] Fair       [x] Poor              Energy:    [] Normal/Unchanged  [] Increased  [x] Decreased        SI [] Present  [] Absent    HI  []Present  [] Absent     Aggression:  [] yes  [] no    Patient is [] able  [] unable to CONTRACT FOR SAFETY     PAST MEDICAL/PSYCHIATRIC HISTORY:   Past Medical History:   Diagnosis Date    ADHD     Bipolar depression ([facility]     PTSD (post-traumatic stress disorder)     Schizoaffective disorder ([facility]        FAMILY/SOCIAL HISTORY:  Family History   Adopted: Yes     Social History     Socioeconomic History    Marital status:      Spouse name: Not on file    Number of children: Not on file    Years of education: Not on file    Highest education level: Not on file   Occupational History    Not on file   Tobacco Use    Smoking status: Every Day     Packs/day: 0.50     Types: Cigarettes    Smokeless tobacco: Not on file   Vaping Use    Vaping Use: Every day   Substance and Sexual Activity    Alcohol use: Yes     Comment: Marian Cristhians couple drinks (wine coolers) every other day\"    Drug use: Not on file    Sexual activity: Yes   Other Topics Concern    Not on file   Social History Narrative    Not on file Social Determinants of Health     Financial Resource Strain: Not on file   Food Insecurity: Not on file   Transportation Needs: Not on file   Physical Activity: Not on file   Stress: Not on file   Social Connections: Not on file   Intimate Partner Violence: Not on file   Housing Stability: Not on file           ROS:  [x] All negative/unchanged except if checked.  Explain positive(checked items) below:  [] Constitutional  [] Eyes  [] Ear/Nose/Mouth/Throat  [] Respiratory  [] CV  [] GI  []   [] Musculoskeletal  [] Skin/Breast  [] Neurological  [] Endocrine  [] Heme/Lymph  [] Allergic/Immunologic    Explanation:     MEDICATIONS:    Current Facility-Administered Medications:     QUEtiapine (SEROQUEL) tablet 75 mg, 75 mg, Oral, Nightly, Gwyn Berrios MD    divalproex (DEPAKOTE) DR tablet 250 mg, 250 mg, Oral, 3 times per day, Gwyn Berrios MD, 250 mg at 10/26/22 0701    haloperidol lactate (HALDOL) injection 5 mg, 5 mg, IntraMUSCular, Q6H PRN **OR** haloperidol (HALDOL) tablet 5 mg, 5 mg, Oral, Q6H PRN, Omar Menjivar MD    hydrOXYzine pamoate (VISTARIL) capsule 50 mg, 50 mg, Oral, Q6H PRN **OR** hydrOXYzine (VISTARIL) injection 50 mg, 50 mg, IntraMUSCular, Q6H PRN, Omar Menjivar MD    acetaminophen (TYLENOL) tablet 650 mg, 650 mg, Oral, Q4H PRN, Omar Menjivar MD    benztropine mesylate (COGENTIN) injection 2 mg, 2 mg, IntraMUSCular, BID PRN, Omar Menjivar MD    magnesium hydroxide (MILK OF MAGNESIA) 400 MG/5ML suspension 30 mL, 30 mL, Oral, Daily PRN, Omar Menjivar MD    nicotine (NICODERM CQ) 14 MG/24HR 1 patch, 1 patch, TransDERmal, Daily, Omar Menjivar MD, 1 patch at 10/26/22 9156      Examination:  /66   Pulse 77   Temp 98.2 °F (36.8 °C) (Oral)   Resp 18   Ht 5' 8\" (1.727 m)   Wt 194 lb (88 kg)   LMP 10/16/2022   SpO2 98%   BMI 29.50 kg/m²   Gait - steady  Medication side effects(SE): no    Mental Status Examination:    Level of consciousness:  within normal limits   Appearance: fair grooming and fair hygiene  Behavior/Motor:  psychomotor retardation  Attitude toward examiner:  cooperative  Speech:  slow   Mood: anxious and depressed  Affect:  blunted  Thought processes:  slow   Thought content:  Suicidal Ideation:  passive  Cognition:  oriented to person, place, and time   Concentration intact  Insight fair   Judgement fair     ASSESSMENT:   Patient symptoms are:  [] Well controlled  [] Improving  [] Worsening  [x] No change      Diagnosis:   Principal Problem:    Bipolar depression ([facility]  Resolved Problems:    * No resolved hospital problems. *      LABS:    No results for input(s): WBC, HGB, PLT in the last 72 hours. Recent Labs     10/25/22  0532      K 3.8      CO2 26   BUN 9   CREATININE 0.67   GLUCOSE 81     Recent Labs     10/25/22  0532   BILITOT 0.6   ALKPHOS 45   AST 14   ALT 16     Lab Results   Component Value Date/Time    LABAMPH Neg 10/22/2022 11:14 PM    BARBSCNU Neg 10/22/2022 11:14 PM    LABBENZ Neg 10/22/2022 11:14 PM    LABMETH Neg 10/22/2022 11:14 PM    OPIATESCREENURINE Neg 10/22/2022 11:14 PM    PHENCYCLIDINESCREENURINE Neg 10/22/2022 11:14 PM    ETOH 24 10/22/2022 08:59 PM     No results found for: TSH, FREET4  No results found for: LITHIUM  No results found for: VALPROATE, CBMZ    RISK ASSESSMENT: High risk of impulsive behaviors and suicide    Treatment Plan:  Reviewed current Medications with the patient. Depakote 250 mg started  Started seroquel 75 mg last night  Risks, benefits, side effects, drug-to-drug interactions and alternatives to treatment were discussed. Collateral information:   CD evaluation  Encourage patient to attend group and other milieu activities.   Discharge planning discussed with the patient and treatment team.    PSYCHOTHERAPY/COUNSELING:  [x] Therapeutic interview  [x] Supportive  [] CBT  [] Ongoing  [] Other    [x] Patient continues to need, on a daily basis, active treatment furnished directly by or requiring the supervision of inpatient psychiatric personnel      Anticipated Length of stay:            Electronically signed by Larnell Riedel, MD on 10/26/2022 at 10:25 AM

## 2022-10-26 NOTE — PROGRESS NOTES
Pt. declined to attend the 0900 community meeting, despite staff encouragement.   GOAL : \" to make calls to try to avoid eviction\" Electronically signed by Ernst Yang on 10/26/2022 at 9:51 AM

## 2022-10-26 NOTE — PROGRESS NOTES
Pt. refused to attend the 1000 skills group, despite staff encouragement.   Electronically signed by Kristi Alejandra on 10/26/2022 at 11:35 AM

## 2022-10-27 PROCEDURE — 6370000000 HC RX 637 (ALT 250 FOR IP): Performed by: PSYCHIATRY & NEUROLOGY

## 2022-10-27 PROCEDURE — 99232 SBSQ HOSP IP/OBS MODERATE 35: CPT | Performed by: PSYCHIATRY & NEUROLOGY

## 2022-10-27 PROCEDURE — 1240000000 HC EMOTIONAL WELLNESS R&B

## 2022-10-27 PROCEDURE — 90833 PSYTX W PT W E/M 30 MIN: CPT | Performed by: PSYCHIATRY & NEUROLOGY

## 2022-10-27 RX ADMIN — DIVALPROEX SODIUM 250 MG: 250 TABLET, DELAYED RELEASE ORAL at 21:09

## 2022-10-27 RX ADMIN — QUETIAPINE FUMARATE 75 MG: 50 TABLET ORAL at 21:09

## 2022-10-27 RX ADMIN — DIVALPROEX SODIUM 250 MG: 250 TABLET, DELAYED RELEASE ORAL at 06:09

## 2022-10-27 RX ADMIN — DIVALPROEX SODIUM 250 MG: 250 TABLET, DELAYED RELEASE ORAL at 13:37

## 2022-10-27 NOTE — PLAN OF CARE
Problem: Anxiety  Goal: Will report anxiety at manageable levels  Description: INTERVENTIONS:  1. Administer medication as ordered  2. Teach and rehearse alternative coping skills  3. Provide emotional support with 1:1 interaction with staff  Outcome: Progressing     Problem: Coping  Goal: Pt/Family able to verbalize concerns and demonstrate effective coping strategies  Description: INTERVENTIONS:  1. Assist patient/family to identify coping skills, available support systems and cultural and spiritual values  2. Provide emotional support, including active listening and acknowledgement of concerns of patient and caregivers  3. Reduce environmental stimuli, as able  4. Instruct patient/family in relaxation techniques, as appropriate  5. Assess for spiritual pain/suffering and initiate Spiritual Care, Psychosocial Clinical Specialist consults as needed  Outcome: Progressing     Problem: Depression/Self Harm  Goal: Effect of psychiatric condition will be minimized and patient will be protected from self harm  Description: INTERVENTIONS:  1. Assess impact of patient's symptoms on level of functioning, self care needs and offer support as indicated  2. Assess patient/family knowledge of depression, impact on illness and need for teaching  3. Provide emotional support, presence and reassurance  4. Assess for possible suicidal thoughts or ideation. If patient expresses suicidal thoughts or statements do not leave alone, initiate Suicide Precautions, move to a room close to the nursing station and obtain sitter  5. Initiate consults as appropriate with Mental Health Professional, Spiritual Care, Psychosocial CNS, and consider a recommendation to the LIP for a Psychiatric Consultation  Outcome: Progressing     Problem: Involuntary Admit  Goal: Will cooperate with staff recommendations and doctor's orders and will demonstrate appropriate behavior  Description: INTERVENTIONS:  1.  Treat underlying conditions and offer medication as ordered  2. Educate regarding involuntary admission procedures and rules  3.  Contain excessive/inappropriate behavior per unit and hospital policies  Outcome: Progressing

## 2022-10-27 NOTE — CARE COORDINATION
FAMILY COLLATERAL NOTE    Family/Support Name: Qiana Kay  Contact #:[phone]  Relationship to Pt[de-identified] BF        Family/Support contact aware of hospitalization:  Presenting Symptoms/Current Concerns: They were arguing about money. Collateral said he does not drive so it is hard for him to find a job. Patient started drinking and was yelling and collateral was \" snickering\" which he said probably did not help. Collateral went to bed and patient took the pills then. Patient was on the phone with her family and they insisted that patient hand the phone to her BF. Patient did not tell Bf that she wanted to kill herself she just told him she took them and wanted to sleep it off. Family told her to go to the ER. Patient is a good parent to her child ( 6 years old )   Top 3 Life Stressors:   Upcoming eviction       Background History Relevant to Current Hospitalization:  Family has patients child currently. Family Mental Health/Substance Use History:   Patients sister recovering from alcohol      Support Network's Goal for Hospitalization: to feel better    Discharge Plan: to come back home      Support Network Supportive of Discharge Plan: yes      Support can confirm Safety of Location and Security of Weapons: no weapons in the home      Support agreeable to Safeguard and Monitor Medications (including Prescription and OTC): Bf will lock up meds and OTC meds as well. BF will give her the meds daily.      Identified Barriers to Compliance with Discharge Plan:   none  Recommendations for Support Network:     Call [facility] if any questions    Jayce Dugan, [facility]

## 2022-10-27 NOTE — PROGRESS NOTES
Patient isolates to room stating \"I am not a people person. \"  Patient comes out just long enough to eat her meals. Patient rests in her bed all day and sometimes reads. Patient is asked what she does outside of here and she states \"I work at [employer], it is stressful. \"  Patient states that it is ok but \"some people are rude, and stuff. \" Pt denies current SI/HI or AVH and states that she is ready to go home. Pt said that she is to be discharged tomorrow. Patient is asked if she needs anything and she states no, \"some people who are here think they are at a [place] resort and are very demanding, I just eat what I get and dont need anything else. \"  Patient is asked to let staff know if there is anything that she needs. Patient denies all at this time. Pt will be monitored.  Electronically signed by Radha Fajardo LPN on 48/63/3935 at 12:28 PM

## 2022-10-27 NOTE — PROGRESS NOTES
Intimate Partner Violence: Not on file   Housing Stability: Not on file           ROS:  [x] All negative/unchanged except if checked.  Explain positive(checked items) below:  [] Constitutional  [] Eyes  [] Ear/Nose/Mouth/Throat  [] Respiratory  [] CV  [] GI  []   [] Musculoskeletal  [] Skin/Breast  [] Neurological  [] Endocrine  [] Heme/Lymph  [] Allergic/Immunologic    Explanation:     MEDICATIONS:    Current Facility-Administered Medications:     QUEtiapine (SEROQUEL) tablet 75 mg, 75 mg, Oral, Nightly, Gill Patterson MD, 75 mg at 10/26/22 2056    divalproex (DEPAKOTE) DR tablet 250 mg, 250 mg, Oral, 3 times per day, Gill Patterson MD, 250 mg at 10/27/22 1337    haloperidol lactate (HALDOL) injection 5 mg, 5 mg, IntraMUSCular, Q6H PRN **OR** haloperidol (HALDOL) tablet 5 mg, 5 mg, Oral, Q6H PRN, Tez Gutierrez MD    hydrOXYzine pamoate (VISTARIL) capsule 50 mg, 50 mg, Oral, Q6H PRN **OR** hydrOXYzine (VISTARIL) injection 50 mg, 50 mg, IntraMUSCular, Q6H PRN, Tez Gutierrez MD    acetaminophen (TYLENOL) tablet 650 mg, 650 mg, Oral, Q4H PRN, Tez Gutierrez MD    benztropine mesylate (COGENTIN) injection 2 mg, 2 mg, IntraMUSCular, BID PRN, Tez Gutierrez MD    magnesium hydroxide (MILK OF MAGNESIA) 400 MG/5ML suspension 30 mL, 30 mL, Oral, Daily PRN, Tez Gutierrez MD    nicotine (NICODERM CQ) 14 MG/24HR 1 patch, 1 patch, TransDERmal, Daily, Tez Gutierrez MD, 1 patch at 10/27/22 7991      Examination:  /71   Pulse 93   Temp 98.1 °F (36.7 °C) (Oral)   Resp 18   Ht 5' 8\" (1.727 m)   Wt 194 lb (88 kg)   LMP 10/16/2022   SpO2 98%   BMI 29.50 kg/m²   Gait - steady  Medication side effects(SE): no    Mental Status Examination:    Level of consciousness:  within normal limits   Appearance:  fair grooming and fair hygiene  Behavior/Motor:  psychomotor retardation  Attitude toward examiner:  cooperative  Speech:  slow   Mood:less anxious and depressed  Affect:  blunted  Thought processes:  slow Thought content:  Suicidal Ideation:  denies  Cognition:  oriented to person, place, and time   Concentration intact  Insight fair   Judgement fair     ASSESSMENT:   Patient symptoms are:  [] Well controlled  [x] Improving  [] Worsening  [] No change      Diagnosis:   Principal Problem:    Bipolar depression ([facility]  Resolved Problems:    * No resolved hospital problems. *      LABS:    No results for input(s): WBC, HGB, PLT in the last 72 hours. Recent Labs     10/25/22  0532      K 3.8      CO2 26   BUN 9   CREATININE 0.67   GLUCOSE 81     Recent Labs     10/25/22  0532   BILITOT 0.6   ALKPHOS 45   AST 14   ALT 16     Lab Results   Component Value Date/Time    LABAMPH Neg 10/22/2022 11:14 PM    BARBSCNU Neg 10/22/2022 11:14 PM    LABBENZ Neg 10/22/2022 11:14 PM    LABMETH Neg 10/22/2022 11:14 PM    OPIATESCREENURINE Neg 10/22/2022 11:14 PM    PHENCYCLIDINESCREENURINE Neg 10/22/2022 11:14 PM    ETOH 24 10/22/2022 08:59 PM     No results found for: TSH, FREET4  No results found for: LITHIUM  No results found for: VALPROATE, CBMZ        Treatment Plan:  Reviewed current Medications with the patient. Depakote level tomorrow  Risks, benefits, side effects, drug-to-drug interactions and alternatives to treatment were discussed. Collateral information:   CD evaluation  Encourage patient to attend group and other milieu activities.   Discharge planning discussed with the patient and treatment team.    PSYCHOTHERAPY/COUNSELING:  [x] Therapeutic interview  [x] Supportive  [] CBT  [] Ongoing  [] Other  Patient was seen 1:1 for 20 minutes, other than E&M time spent, focusing on      - coping skills techniques     - Anxiety management techniques discussed including deep breathing exercise and PMR     - discussing patients strength and weakness     - Focusing on negative cognition and maladaptive thoughts, which is feeding and maintaining the depression symptoms       [x] Patient continues to need, on a daily basis, active treatment furnished directly by or requiring the supervision of inpatient psychiatric personnel      Anticipated Length of stay:            Electronically signed by Dann Rosen MD on 10/27/2022 at 4:39 PM

## 2022-10-27 NOTE — PROGRESS NOTES
Pt. declined to attend the 0900 community meeting, despite staff encouragement.  GOAL : \" to make it through the day\" Electronically signed by Feliz Torres on 10/27/2022 at 11:47 AM

## 2022-10-27 NOTE — PLAN OF CARE
Patient up ad joanne minimal interaction with staff and peers. Flat/sad affect. Pt denies anxiety. Rates her depression 4/10. Pt denies SI/HI and AVH. Patient reluctant to attend groups. Patient remains free from harm. Problem: Anxiety  Goal: Will report anxiety at manageable levels  Description: INTERVENTIONS:  1. Administer medication as ordered  2. Teach and rehearse alternative coping skills  3. Provide emotional support with 1:1 interaction with staff  10/26/2022 1949 by Radha Méndez RN  Outcome: Progressing     Problem: Coping  Goal: Pt/Family able to verbalize concerns and demonstrate effective coping strategies  Description: INTERVENTIONS:  1. Assist patient/family to identify coping skills, available support systems and cultural and spiritual values  2. Provide emotional support, including active listening and acknowledgement of concerns of patient and caregivers  3. Reduce environmental stimuli, as able  4. Instruct patient/family in relaxation techniques, as appropriate  5. Assess for spiritual pain/suffering and initiate Spiritual Care, Psychosocial Clinical Specialist consults as needed  10/26/2022 1949 by Radha Méndez RN  Outcome: Progressing     Problem: Depression/Self Harm  Goal: Effect of psychiatric condition will be minimized and patient will be protected from self harm  Description: INTERVENTIONS:  1. Assess impact of patient's symptoms on level of functioning, self care needs and offer support as indicated  2. Assess patient/family knowledge of depression, impact on illness and need for teaching  3. Provide emotional support, presence and reassurance  4. Assess for possible suicidal thoughts or ideation. If patient expresses suicidal thoughts or statements do not leave alone, initiate Suicide Precautions, move to a room close to the nursing station and obtain sitter  5.  Initiate consults as appropriate with Mental Health Professional, Spiritual Care, Psychosocial CNS, and consider a recommendation to the LIP for a Psychiatric Consultation  10/26/2022 1949 by Edgardo Hernandez, RN  Outcome: Progressing     Problem: Involuntary Admit  Goal: Will cooperate with staff recommendations and doctor's orders and will demonstrate appropriate behavior  Description: INTERVENTIONS:  1. Treat underlying conditions and offer medication as ordered  2. Educate regarding involuntary admission procedures and rules  3.  Contain excessive/inappropriate behavior per unit and hospital policies  [id number] by Edgardo Hernandez, RN  Outcome: Progressing

## 2022-10-27 NOTE — PROGRESS NOTES
Pt. refused to attend the 1000 skills group, despite staff encouragement.   Electronically signed by Quinton Manjarrez on 10/27/2022 at 11:47 AM

## 2022-10-28 VITALS
OXYGEN SATURATION: 98 % | BODY MASS INDEX: 29.4 KG/M2 | WEIGHT: 194 LBS | SYSTOLIC BLOOD PRESSURE: 106 MMHG | HEART RATE: 75 BPM | RESPIRATION RATE: 18 BRPM | DIASTOLIC BLOOD PRESSURE: 60 MMHG | HEIGHT: 68 IN | TEMPERATURE: 99 F

## 2022-10-28 LAB — VALPROIC ACID LEVEL: 46.7 UG/ML (ref 50–100)

## 2022-10-28 PROCEDURE — 80164 ASSAY DIPROPYLACETIC ACD TOT: CPT

## 2022-10-28 PROCEDURE — 99239 HOSP IP/OBS DSCHRG MGMT >30: CPT | Performed by: PSYCHIATRY & NEUROLOGY

## 2022-10-28 PROCEDURE — 36415 COLL VENOUS BLD VENIPUNCTURE: CPT

## 2022-10-28 PROCEDURE — 6370000000 HC RX 637 (ALT 250 FOR IP): Performed by: PSYCHIATRY & NEUROLOGY

## 2022-10-28 RX ORDER — QUETIAPINE FUMARATE 50 MG/1
50 TABLET, FILM COATED ORAL NIGHTLY
Qty: 15 TABLET | Refills: 3 | Status: SHIPPED | OUTPATIENT
Start: 2022-10-28

## 2022-10-28 RX ORDER — DIVALPROEX SODIUM 250 MG/1
250 TABLET, DELAYED RELEASE ORAL EVERY 8 HOURS SCHEDULED
Qty: 45 TABLET | Refills: 3 | Status: SHIPPED | OUTPATIENT
Start: 2022-10-28

## 2022-10-28 RX ADMIN — DIVALPROEX SODIUM 250 MG: 250 TABLET, DELAYED RELEASE ORAL at 09:11

## 2022-10-28 NOTE — DISCHARGE SUMMARY
DISCHARGE SUMMARY      Patient ID:  Hannah Mahajan  [id number]  27 y.o.  1990      Admit date: 10/22/2022    Discharge date and time: 10/28/2022    Admitting Physician: Almer Bloch, MD     Discharge Physician: Dr Dmitri Ochoa MD    Admission Diagnoses: Bipolar depression ([facility] [address] [F31.9]  Gabapentin overdose, undetermined intent, initial encounter [T42.6X4A]    Admission Condition: poor    Discharged Condition: stable    Admission Circumstance: The patient is a 27 y.o. female single was living with BF, 8year old son work   significant past history of bipolar disorder  Recent admit to 1 W in July  Pt see psychiatrist at [address] [place] [address] presented to ED after taking a reported 90 300mg Gabapentin. She reports she told her sister, who had her come to Urgent care then ED. Pt report that she is so stressed out overwhelmed  Relationship with BF not going well, fighting eviction, work is stressful     Has been feeling depressed  Minimizing the seriousness of SA  Pt report that she wanted to go to sleep and wake up next day  Sister called her randomly who forced her to go to ER  Pt is upset about the whole sequence     Severity: Rating mood to be around 3/10 (10- good)  Quality:melancholic  Content: Hopeless, worthless and helpless feeling  Suicidal thoughts - minimizng the suicidal attempt  Associated symptoms:  Poor concentration, anhedonia, decrease motivation  Sleep- increase and appetite- good        The patient is currently receiving care for the above psychiatric illness. Medications Prior to Admission:     Prescriptions Prior to Admission   Medications Prior to Admission: gabapentin (NEURONTIN) 300 MG capsule, Take 600 mg by mouth 2 times daily.   ARIPiprazole (ABILIFY) 5 MG tablet, Take 1 tablet by mouth daily (Patient not taking: Reported on 10/23/2022)  Melatonin 10 MG TABS, Take 1 tablet by mouth nightly (Patient not taking: Reported on 10/23/2022)        Compliance:not taking any medication other than neurontin for anxiety     Psychiatric Review of Systems       Depression: YES     Vicenta or Hypomania:  yes      Panic Attacks:  no     Phobias:  no     Obsessions and Compulsions:  no     PTSD : no     Hallucinations:  no     Delusions:  no     Substance Abuse History:  ETOH: 2 times a week, 2 drinks  Marijuana: no  Opiates: no  Other Drugs: no        Past Psychiatric History:  Prior Diagnosis:  Bipolar depression, Borderline PD  Psychiatrist: [facility]  Therapist:yes  Hospitalization: yes  Hx of Suicidal Attempts: yes- cut self in the past  Hx of violence:  no  ECT: no  Previous discontinued Psychiatric Med Trials: not recall      PAST MEDICAL/PSYCHIATRIC HISTORY:   Past Medical History:   Diagnosis Date    ADHD     Bipolar depression (HCC)     PTSD (post-traumatic stress disorder)     [facility]        FAMILY/SOCIAL HISTORY:  Family History   Adopted: Yes     Social History     Socioeconomic History    Marital status:      Spouse name: Not on file    Number of children: Not on file    Years of education: Not on file    Highest education level: Not on file   Occupational History    Not on file   Tobacco Use    Smoking status: Every Day     Packs/day: 0.50     Types: Cigarettes    Smokeless tobacco: Not on file   Vaping Use    Vaping Use: Every day   Substance and Sexual Activity    Alcohol use: Yes     Comment: \"a couple drinks (wine coolers) every other day\"    Drug use: Not on file    Sexual activity: Yes   Other Topics Concern    Not on file   Social History Narrative    Not on file     Social Determinants of Health     Financial Resource Strain: Not on file   Food Insecurity: Not on file   Transportation Needs: Not on file   Physical Activity: Not on file   Stress: Not on file   Social Connections: Not on file   Intimate Partner Violence: Not on file   Housing Stability: Not on file       MEDICATIONS:    Current Facility-Administered Medications:     QUEtiapine (SEROQUEL) tablet 75 mg, 75 mg, Oral, Nightly, Angi Herring MD, 75 mg at 10/27/22 2109    divalproex (DEPAKOTE) DR tablet 250 mg, 250 mg, Oral, 3 times per day, Angi Herring MD, 250 mg at 10/28/22 0911    haloperidol lactate (HALDOL) injection 5 mg, 5 mg, IntraMUSCular, Q6H PRN **OR** haloperidol (HALDOL) tablet 5 mg, 5 mg, Oral, Q6H PRN, Michell Moeller MD    hydrOXYzine pamoate (VISTARIL) capsule 50 mg, 50 mg, Oral, Q6H PRN **OR** hydrOXYzine (VISTARIL) injection 50 mg, 50 mg, IntraMUSCular, Q6H PRN, Michell Moeller MD    acetaminophen (TYLENOL) tablet 650 mg, 650 mg, Oral, Q4H PRN, Michell Moeller MD    benztropine mesylate (COGENTIN) injection 2 mg, 2 mg, IntraMUSCular, BID PRN, Michell Moeller MD    magnesium hydroxide (MILK OF MAGNESIA) 400 MG/5ML suspension 30 mL, 30 mL, Oral, Daily PRN, Michell Moeller MD    nicotine (NICODERM CQ) 14 MG/24HR 1 patch, 1 patch, TransDERmal, Daily, Michell Moeller MD, 1 patch at 10/28/22 0900    Examination:  /60   Pulse 75   Temp 99 °F (37.2 °C) (Oral)   Resp 18   Ht 5' 8\" (1.727 m)   Wt 194 lb (88 kg)   LMP 10/16/2022   SpO2 98%   BMI 29.50 kg/m²   Gait - steady    HOSPITAL COURSE[de-identified]  Following admission to the hospital, patient had a complete physical exam and blood work up  Patient was monitored closely with suicide precaution  Patient was started on meds as listed below  Was encouraged to participate in group and other milieu activity  Patient started to feel better with this combination of treatment. Significant progress in the symptoms since admission.     Mood better, with the score of 2/10 - bad  No AVH or paranoid thoughts  No Hopeless or worthless feeling  No active SI/HI  Appetite:  [x] Normal  [] Increased  [] Decreased    Sleep:       [x] Normal  [] Fair       [] Poor            Energy:    [x] Normal  [] Increased  [] Decreased     SI [] Present  [x] Absent  HI  []Present  [x] Absent   Aggression:  [] yes  [] no  Patient is [x] able  [] unable to CONTRACT FOR SAFETY   Medication side effects(SE):  [x] None(Psych. Meds.) [] Other      Mental Status Examination on discharge:    Level of consciousness:  within normal limits   Appearance:  well-appearing  Behavior/Motor:  no abnormalities noted  Attitude toward examiner:  attentive and good eye contact  Speech:  spontaneous, normal rate and normal volume   Mood: euthymic  Affect:  mood congruent  Thought processes:  coherent   Thought content:  Suicidal Ideation:  denies suicidal ideation  Delusions:  no evidence of delusions  Perceptual Disturbance:  denies any perceptual disturbance  Cognition:  oriented to person, place, and time   Concentration intact  Memory intact  Insight good   Judgement fair   Fund of Knowledge adequate      ASSESSMENT:  Patient symptoms are:  [x] Well controlled  [x] Improving  [] Worsening  [] No change      Diagnosis:  Principal Problem:    Bipolar depression ([facility] [address]  Resolved Problems:    * No resolved hospital problems. *      LABS:    No results for input(s): WBC, HGB, PLT in the last 72 hours. No results for input(s): NA, K, CL, CO2, BUN, CREATININE, GLUCOSE in the last 72 hours. No results for input(s): BILITOT, ALKPHOS, AST, ALT in the last 72 hours. Lab Results   Component Value Date/Time    LABAMPH Neg 10/22/2022 11:14 PM    BARBSCNU Neg 10/22/2022 11:14 PM    LABBENZ Neg 10/22/2022 11:14 PM    LABMETH Neg 10/22/2022 11:14 PM    OPIATESCREENURINE Neg 10/22/2022 11:14 PM    PHENCYCLIDINESCREENURINE Neg 10/22/2022 11:14 PM    ETOH 24 10/22/2022 08:59 PM     No results found for: TSH, FREET4  No results found for: LITHIUM  Lab Results   Component Value Date    VALPROATE 46.7 (L) 10/28/2022       RISK ASSESSMENT AT DISCHARGE: Low risk for suicide and homicide. Treatment Plan:  Reviewed current Medications with the patient. Education provided on the complaince with treatment.     Risks, benefits, side effects, drug-to-drug interactions and alternatives to treatment were discussed. Encourage patient to attend outpatient follow up appointment and therapy. Patient was advised to call the outpatient provider, visit the nearest ED or call 911 if symptoms are not manageable. Patient's family member was contacted prior to the discharge.          Medication List        START taking these medications      divalproex 250 MG DR tablet  Commonly known as: DEPAKOTE  Take 1 tablet by mouth every 8 hours     QUEtiapine 50 MG tablet  Commonly known as: SEROQUEL  Take 1 tablet by mouth nightly            STOP taking these medications      ARIPiprazole 5 MG tablet  Commonly known as: Abilify     gabapentin 300 MG capsule  Commonly known as: NEURONTIN     Melatonin 10 MG Tabs               Where to Get Your Medications        These medications were sent to [facility], [address], [place] [address]      Phone: [phone]   divalproex 250 MG DR tablet  QUEtiapine 50 MG tablet           Reason for more than one antipsychotic:   [x] N/A  [] 3 failed monotherapy(drugs tried):  [] Cross over to a new antipsychotic  [] Taper to monotherapy from polypharmacy  [] Augmentation of Clozapine therapy due to treatment resistance to single therapy        TIME SPEND - 35 MINUTES TO COMPLETE THE EVALUATION, DISCHARGE SUMMARY, MEDICATION RECONCILIATION AND FOLLOW UP CARE     Signed:  Joe Villatoro MD  10/28/2022  10:28 AM

## 2022-10-28 NOTE — GROUP NOTE
Group Therapy Note    Date: 10/25/2022    Group Start Time: 1430  Group End Time: 1450  Group Topic: Cognitive Skills    [facility]    [place] Lomas, [facility]        Group Therapy Note    Attendees: 8       Patient's Goal:  coping skills    Notes:  patient participated    Status After Intervention:  Improved    Participation Level: Interactive    Participation Quality: Appropriate      Speech:  normal      Thought Process/Content: Logical      Affective Functioning: Congruent      Mood: anxious      Level of consciousness:  Alert      Response to Learning: Progressing to goal      Endings: None Reported    Modes of Intervention: Support      Discipline Responsible: /Counselor      Signature:  Jose Raul Rubio, [facility]

## 2022-10-28 NOTE — PROGRESS NOTES
Pt. refused to attend the 1000 skills group, despite staff encouragement.  Electronically signed by Estela [address] on 10/28/2022 at 11:27 AM

## 2022-10-28 NOTE — PROGRESS NOTES
Pt. declined to attend the 0900 community meeting, despite staff encouragement.  Goal - \"Make it through the day\" Electronically signed by Awais Campos, [address] on 10/28/2022 at 11:26 AM

## 2022-10-28 NOTE — DISCHARGE INSTRUCTIONS
Keep all follow up appointments, take medications as ordered, utilize positive supports, abstain from use of alcohol and drugs. If symptoms return or you feel at risk to yourself or others, please call 911, return the nearest emergency room, or call your local crisis hotline:  [facility]: [phone]: [phone] [address]: [phone]    Due to the [address] Smoking Cessation Group is not currently available. For assistance with quitting smoking please go to https://smokefree.gov. A prescription for an FDA-approved tobacco cessation medication was offered at discharge and the patient refused. Someone from [facility]. will be calling you tomorrow to follow up on your care. If you don't hear from us, give us a call! [phone].

## 2022-10-28 NOTE — PLAN OF CARE
minimized and patient will be protected from self harm  Description: INTERVENTIONS:  1. Assess impact of patient's symptoms on level of functioning, self care needs and offer support as indicated  2. Assess patient/family knowledge of depression, impact on illness and need for teaching  3. Provide emotional support, presence and reassurance  4. Assess for possible suicidal thoughts or ideation. If patient expresses suicidal thoughts or statements do not leave alone, initiate Suicide Precautions, move to a room close to the nursing station and obtain sitter  5. Initiate consults as appropriate with Mental Health Professional, Spiritual Care, Psychosocial CNS, and consider a recommendation to the [facility] for a Psychiatric Consultation  10/27/2022 2342 by Mandi Stevens RN  Outcome: Progressing  Flowsheets (Taken 10/27/2022 1616 by Lisa Evans RN)  Effect of psychiatric condition will be minimized and patient will be protected from self harm: Provide emotional support, presence and reassurance  10/27/2022 1446 by Luba Barron RN  Outcome: Progressing     Problem: Involuntary Admit  Goal: Will cooperate with staff recommendations and doctor's orders and will demonstrate appropriate behavior  Description: INTERVENTIONS:  1. Treat underlying conditions and offer medication as ordered  2. Educate regarding involuntary admission procedures and rules  3.  Contain excessive/inappropriate behavior per unit and hospital policies  [id number] by Mandi Stevens RN  Outcome: Progressing  Flowsheets (Taken 10/27/2022 1616 by Lisa Evans RN)  Will cooperate with staff recommendations and doctor's orders and will demonstrate appropriate behavior: Contain excessive/inappropriate behavior per unit and hospital policies  [id number] by Luba Barron RN  Outcome: Progressing

## 2023-02-05 PROBLEM — R20.2 TINGLING IN EXTREMITIES: Status: ACTIVE | Noted: 2023-02-05

## 2023-02-05 PROBLEM — F31.2 SEVERE MANIC BIPOLAR I DISORDER WITH PSYCHOTIC FEATURES (MULTI): Status: ACTIVE | Noted: 2023-02-05

## 2023-02-05 PROBLEM — J34.89 NASAL DRYNESS: Status: ACTIVE | Noted: 2023-02-05

## 2023-02-05 PROBLEM — F32.A DEPRESSION: Status: ACTIVE | Noted: 2023-02-05

## 2023-02-05 PROBLEM — R91.1 NODULE OF RIGHT LUNG: Status: ACTIVE | Noted: 2023-02-05

## 2023-02-05 PROBLEM — G47.33 OSA ON CPAP: Status: ACTIVE | Noted: 2023-02-05

## 2023-02-05 PROBLEM — B99.9 INFECTION: Status: ACTIVE | Noted: 2023-02-05

## 2023-02-05 PROBLEM — H66.90 CHRONIC OTITIS MEDIA: Status: ACTIVE | Noted: 2023-02-05

## 2023-02-05 PROBLEM — G93.2 IIH (IDIOPATHIC INTRACRANIAL HYPERTENSION): Status: ACTIVE | Noted: 2023-02-05

## 2023-02-05 PROBLEM — R53.83 FATIGUE: Status: ACTIVE | Noted: 2023-02-05

## 2023-02-05 PROBLEM — R07.9 CHEST PAIN: Status: ACTIVE | Noted: 2023-02-05

## 2023-02-05 PROBLEM — G44.40 REBOUND HEADACHE: Status: ACTIVE | Noted: 2023-02-05

## 2023-02-05 PROBLEM — K21.9 GERD (GASTROESOPHAGEAL REFLUX DISEASE): Status: ACTIVE | Noted: 2023-02-05

## 2023-02-05 PROBLEM — S81.812A LACERATION OF LEG, LEFT: Status: ACTIVE | Noted: 2023-02-05

## 2023-02-05 PROBLEM — H16.223 KERATOCONJUNCTIVITIS SICCA, NOT SPECIFIED AS SJOGREN'S, BILATERAL: Status: ACTIVE | Noted: 2023-02-05

## 2023-02-05 PROBLEM — H53.9 VISUAL DISTURBANCES: Status: ACTIVE | Noted: 2023-02-05

## 2023-02-05 PROBLEM — K59.00 CONSTIPATION: Status: ACTIVE | Noted: 2023-02-05

## 2023-02-05 PROBLEM — E66.9 OBESITY (BMI 30-39.9): Status: ACTIVE | Noted: 2023-02-05

## 2023-02-05 PROBLEM — M21.41 ACQUIRED PES PLANUS OF BOTH FEET: Status: ACTIVE | Noted: 2023-02-05

## 2023-02-05 PROBLEM — N89.8 VAGINAL DISCHARGE: Status: ACTIVE | Noted: 2023-02-05

## 2023-02-05 PROBLEM — J34.89 NASAL CRUSTING: Status: ACTIVE | Noted: 2023-02-05

## 2023-02-05 PROBLEM — L97.922: Status: ACTIVE | Noted: 2023-02-05

## 2023-02-05 PROBLEM — J32.0 CHRONIC MAXILLARY SINUSITIS: Status: ACTIVE | Noted: 2023-02-05

## 2023-02-05 PROBLEM — J34.89 NASAL OBSTRUCTION: Status: ACTIVE | Noted: 2023-02-05

## 2023-02-05 PROBLEM — J34.3 HYPERTROPHY OF BOTH INFERIOR NASAL TURBINATES: Status: ACTIVE | Noted: 2023-02-05

## 2023-02-05 PROBLEM — K64.9 HEMORRHOIDS: Status: ACTIVE | Noted: 2023-02-05

## 2023-02-05 PROBLEM — T14.8XXA WOUND INFECTION: Status: ACTIVE | Noted: 2023-02-05

## 2023-02-05 PROBLEM — F20.9 SCHIZOPHRENIA (MULTI): Status: ACTIVE | Noted: 2023-02-05

## 2023-02-05 PROBLEM — R87.611 PAP SMEAR OF CERVIX WITH ASCUS, CANNOT EXCLUDE HGSIL: Status: ACTIVE | Noted: 2023-02-05

## 2023-02-05 PROBLEM — F15.20 CAFFEINE DEPENDENCE (MULTI): Status: ACTIVE | Noted: 2023-02-05

## 2023-02-05 PROBLEM — J34.89 LESION OR MASS OF PARANASAL SINUSES: Status: ACTIVE | Noted: 2023-02-05

## 2023-02-05 PROBLEM — F10.20 ALCOHOLISM (MULTI): Status: ACTIVE | Noted: 2023-02-05

## 2023-02-05 PROBLEM — M72.2 PLANTAR FASCIITIS, BILATERAL: Status: ACTIVE | Noted: 2023-02-05

## 2023-02-05 PROBLEM — F31.9 BIPOLAR DISORDER, UNSPECIFIED (MULTI): Status: ACTIVE | Noted: 2023-02-05

## 2023-02-05 PROBLEM — J32.9 SINUSITIS: Status: ACTIVE | Noted: 2023-02-05

## 2023-02-05 PROBLEM — H52.203 ASTIGMATISM OF BOTH EYES: Status: ACTIVE | Noted: 2023-02-05

## 2023-02-05 PROBLEM — T14.8XXA BONE BRUISE: Status: ACTIVE | Noted: 2023-02-05

## 2023-02-05 PROBLEM — R10.31 CONTINUOUS RLQ ABDOMINAL PAIN: Status: ACTIVE | Noted: 2023-02-05

## 2023-02-05 PROBLEM — K62.89 ANAL PAIN: Status: ACTIVE | Noted: 2023-02-05

## 2023-02-05 PROBLEM — H04.123 DRY EYES: Status: ACTIVE | Noted: 2023-02-05

## 2023-02-05 PROBLEM — R51.9 BILATERAL HEADACHE: Status: ACTIVE | Noted: 2023-02-05

## 2023-02-05 PROBLEM — R10.2 PELVIC CRAMPING: Status: ACTIVE | Noted: 2023-02-05

## 2023-02-05 PROBLEM — R20.2 PARESTHESIA OF BILATERAL LEGS: Status: ACTIVE | Noted: 2023-02-05

## 2023-02-05 PROBLEM — R25.2 CRAMPING OF HANDS: Status: ACTIVE | Noted: 2023-02-05

## 2023-02-05 PROBLEM — L08.9 WOUND INFECTION: Status: ACTIVE | Noted: 2023-02-05

## 2023-02-05 PROBLEM — M79.673 FOOT PAIN: Status: ACTIVE | Noted: 2023-02-05

## 2023-02-05 PROBLEM — R10.9 ABDOMINAL PAIN: Status: ACTIVE | Noted: 2023-02-05

## 2023-02-05 PROBLEM — R22.9 LUMP OF SKIN: Status: ACTIVE | Noted: 2023-02-05

## 2023-02-05 PROBLEM — D64.9 ANEMIA: Status: ACTIVE | Noted: 2023-02-05

## 2023-02-05 PROBLEM — M21.42 ACQUIRED PES PLANUS OF BOTH FEET: Status: ACTIVE | Noted: 2023-02-05

## 2023-02-05 PROBLEM — R42 DIZZINESS: Status: ACTIVE | Noted: 2023-02-05

## 2023-02-05 PROBLEM — J34.2 DEVIATED NASAL SEPTUM: Status: ACTIVE | Noted: 2023-02-05

## 2023-02-05 RX ORDER — MECLIZINE HYDROCHLORIDE 25 MG/1
25 TABLET ORAL EVERY 8 HOURS PRN
COMMUNITY
End: 2023-12-04

## 2023-02-05 RX ORDER — NALTREXONE HYDROCHLORIDE 50 MG/1
1 TABLET, FILM COATED ORAL DAILY
COMMUNITY
End: 2023-12-04

## 2023-02-05 RX ORDER — FERROUS SULFATE 325(65) MG
1 TABLET ORAL 3 TIMES DAILY
COMMUNITY
End: 2024-05-23 | Stop reason: WASHOUT

## 2023-02-05 RX ORDER — SODIUM CHLORIDE/SODIUM BICARB
PACKET (EA) NASAL
COMMUNITY
End: 2023-12-04

## 2023-02-05 RX ORDER — NAPROXEN 500 MG/1
500 TABLET ORAL EVERY 12 HOURS
COMMUNITY
End: 2023-12-04

## 2023-02-05 RX ORDER — DOCUSATE SODIUM 100 MG/1
100 CAPSULE, LIQUID FILLED ORAL DAILY PRN
COMMUNITY
End: 2024-05-23 | Stop reason: WASHOUT

## 2023-02-05 RX ORDER — GABAPENTIN 300 MG/1
1 CAPSULE ORAL NIGHTLY
COMMUNITY
End: 2023-12-04

## 2023-02-05 RX ORDER — AMOXICILLIN AND CLAVULANATE POTASSIUM 875; 125 MG/1; MG/1
875 TABLET, FILM COATED ORAL EVERY 12 HOURS
COMMUNITY
End: 2023-12-04

## 2023-02-05 RX ORDER — MUPIROCIN 20 MG/G
OINTMENT TOPICAL 2 TIMES DAILY
COMMUNITY
End: 2023-12-04

## 2023-03-06 ENCOUNTER — OFFICE VISIT (OUTPATIENT)
Dept: PRIMARY CARE | Facility: CLINIC | Age: 33
End: 2023-03-06
Payer: COMMERCIAL

## 2023-03-06 VITALS
HEIGHT: 68 IN | WEIGHT: 293 LBS | HEART RATE: 59 BPM | BODY MASS INDEX: 44.41 KG/M2 | OXYGEN SATURATION: 99 % | DIASTOLIC BLOOD PRESSURE: 69 MMHG | TEMPERATURE: 96.3 F | SYSTOLIC BLOOD PRESSURE: 116 MMHG

## 2023-03-06 DIAGNOSIS — H69.91 DYSFUNCTION OF RIGHT EUSTACHIAN TUBE: ICD-10-CM

## 2023-03-06 DIAGNOSIS — H92.01 EAR PAIN, REFERRED, RIGHT: Primary | ICD-10-CM

## 2023-03-06 PROCEDURE — 99213 OFFICE O/P EST LOW 20 MIN: CPT | Performed by: FAMILY MEDICINE

## 2023-03-06 RX ORDER — FLUTICASONE FUROATE 27.5 UG/1
1 SPRAY, METERED NASAL
Qty: 10 G | Refills: 5 | Status: SHIPPED | OUTPATIENT
Start: 2023-03-06 | End: 2023-12-04

## 2023-03-06 ASSESSMENT — PAIN SCALES - GENERAL: PAINLEVEL: 4

## 2023-03-06 NOTE — PROGRESS NOTES
"Subjective   Chief complaint: Megan Sosa is a 32 y.o. female who presents for Establish Care.    HPI:  Here with Rt ear discomfort and pain x 2 month.on and off , received 2 courses of amoxicillin with no relief.She also reports dizziness on and off for which she still complaining off. Her dizziness started 6 month ago. She also reports runny nose and hoarseness of voice x 2 days ago.  LMP : Irregular, last 4 days ago, sexually active and have nexplanon for birth control.      Ros:  No fever/chills  NO SOB  No n/v/d  No skin rashes.    Objective   /69 (BP Location: Right arm, Patient Position: Sitting, BP Cuff Size: Small adult)   Pulse 59   Temp 35.7 °C (96.3 °F) (Temporal)   Ht 1.727 m (5' 8\")   Wt (!) 177 kg (390 lb 3.4 oz)   SpO2 99%   BMI 59.33 kg/m²     General Appearance:    Alert, cooperative, no distress, appears stated age   Head:    Normocephalic, without obvious abnormality, atraumatic   Eyes:    PERRL, conjunctiva/corneas clear, EOM's intact, fundi     benign, both eyes        Ears:    Normal TM's and external ear canals, both ears       Throat:   Lips, mucosa, and tongue normal; teeth and gums normal   Neck:   Supple, symmetrical, trachea midline, no adenopathy;        thyroid:  No enlargement/tenderness/nodules; no carotid    bruit or JVD   Lungs:     Clear to auscultation bilaterally, respirations unlabored   Heart:    Regular rate and rhythm, S1 and S2 normal, no murmur, rub    or gallop   Abdomen:     Soft, non-tender, bowel sounds active all four quadrants,     no masses, no organomegaly   Extremities:   Extremities normal, atraumatic, no cyanosis or edema   Neurologic:   Motror and sensory fx grossly intact        I have reviewed and reconciled the medication list with the patient today.   Current Outpatient Medications:     amoxicillin-pot clavulanate (Augmentin) 875-125 mg tablet, Take 1 tablet (875 mg) by mouth every 12 (twelve) hours., Disp: , Rfl:     cariprazine (Vraylar) 4.5 mg " capsule, Take 1 capsule (4.5 mg) by mouth 1 (one) time each day., Disp: , Rfl:     docusate sodium (Colace) 100 mg capsule, Take 1 capsule (100 mg) by mouth if needed each day (Anemia)., Disp: , Rfl:     ferrous sulfate 325 (65 Fe) MG tablet, Take 1 tablet (325 mg) by mouth in the morning, at noon, and at bedtime. W/ food, Disp: , Rfl:     gabapentin (Neurontin) 300 mg capsule, Take 1 capsule (300 mg) by mouth at bedtime., Disp: , Rfl:     meclizine (Antivert) 25 mg tablet, Take 1 tablet (25 mg) by mouth every 8 (eight) hours if needed for dizziness., Disp: , Rfl:     mupirocin (Bactroban) 2 % ointment, twice a day. Apply sparingly to affected areas, Disp: , Rfl:     naltrexone (Depade) 50 mg tablet, Take 1 tablet (50 mg) by mouth 1 (one) time each day., Disp: , Rfl:     naproxen (Naprosyn) 500 mg tablet, Take 1 tablet (500 mg) by mouth every 12 (twelve) hours. W/food as needed, Disp: , Rfl:     sodium bicarb-sodium chloride (Neilmed Sinus Rinse Refill) packet, Administer into affected nostril(s)., Disp: , Rfl:     sodium chloride (Ayr) 0.65 % nasal drops, Administer into affected nostril(s)., Disp: , Rfl:      Imaging:  No results found.     Labs reviewed:    Lab Results   Component Value Date    WBC 7.0 07/22/2022    HGB 11.6 (L) 07/22/2022    HCT 36.2 07/22/2022     07/22/2022    CHOL 114 04/01/2021    TRIG 53 04/01/2021    HDL 43.9 04/01/2021    ALT 19 07/22/2022    AST 21 07/22/2022     07/22/2022    K 3.9 07/22/2022     (H) 07/22/2022    CREATININE 0.81 07/22/2022    BUN 10 07/22/2022    CO2 28 07/22/2022    TSH 1.39 07/22/2022    INR 1.1 07/22/2022    HGBA1C 4.6 04/01/2021       Assessment/Plan   Problem List Items Addressed This Visit          Nervous    Ear pain, referred, right - Primary    Relevant Medications    fluticasone (Flonase Sensimist) 27.5 mcg/actuation nasal spray       Other    Dysfunction of right eustachian tube    Relevant Medications    fluticasone (Flonase Sensimist)  27.5 mcg/actuation nasal spray       Diagnosis and Management discussed with the patient.  Patient agreeable with plan.  Patient advised to Return to clinic with new or unresolved symptoms.  Laurence Carter MD

## 2023-04-17 ENCOUNTER — OFFICE VISIT (OUTPATIENT)
Dept: PRIMARY CARE | Facility: CLINIC | Age: 33
End: 2023-04-17
Payer: COMMERCIAL

## 2023-04-17 VITALS
DIASTOLIC BLOOD PRESSURE: 74 MMHG | HEIGHT: 68 IN | SYSTOLIC BLOOD PRESSURE: 119 MMHG | BODY MASS INDEX: 26.07 KG/M2 | OXYGEN SATURATION: 97 % | HEART RATE: 65 BPM | WEIGHT: 172 LBS

## 2023-04-17 DIAGNOSIS — F17.200 NICOTINE DEPENDENCE, UNCOMPLICATED, UNSPECIFIED NICOTINE PRODUCT TYPE: ICD-10-CM

## 2023-04-17 DIAGNOSIS — F12.90 CURRENT EVERY DAY CANNABIS VAPING: ICD-10-CM

## 2023-04-17 DIAGNOSIS — J98.01 BRONCHOSPASM: ICD-10-CM

## 2023-04-17 DIAGNOSIS — J30.9 ALLERGIC SINUSITIS: Primary | ICD-10-CM

## 2023-04-17 DIAGNOSIS — F17.290 VAPING NICOTINE DEPENDENCE, TOBACCO PRODUCT: ICD-10-CM

## 2023-04-17 DIAGNOSIS — R05.3 CHRONIC COUGH: ICD-10-CM

## 2023-04-17 PROCEDURE — 99204 OFFICE O/P NEW MOD 45 MIN: CPT | Performed by: INTERNAL MEDICINE

## 2023-04-17 PROCEDURE — 4004F PT TOBACCO SCREEN RCVD TLK: CPT | Performed by: INTERNAL MEDICINE

## 2023-04-17 RX ORDER — ALBUTEROL SULFATE 90 UG/1
2 AEROSOL, METERED RESPIRATORY (INHALATION) EVERY 4 HOURS PRN
Qty: 8.5 G | Refills: 0 | Status: SHIPPED | OUTPATIENT
Start: 2023-04-17 | End: 2024-05-23

## 2023-04-17 RX ORDER — AZELASTINE 1 MG/ML
2 SPRAY, METERED NASAL ONCE
Status: SHIPPED | OUTPATIENT
Start: 2023-04-17

## 2023-04-17 RX ORDER — HYDROGEN PEROXIDE 3 %
20 SOLUTION, NON-ORAL MISCELLANEOUS
COMMUNITY
End: 2023-12-04

## 2023-04-17 RX ORDER — ARIPIPRAZOLE 15 MG/1
15 TABLET ORAL DAILY
COMMUNITY

## 2023-04-17 RX ORDER — ESCITALOPRAM OXALATE 20 MG/1
20 TABLET ORAL DAILY
COMMUNITY

## 2023-04-17 NOTE — PROGRESS NOTES
"Subjective   Patient ID: Megan Sosa is a 32 y.o. female who presents for Allergic Rhinitis (New patient here for allergy symptoms for a month).    HPI   C/O sinus congestion, mucus production, itchy runny nose, cough, intermittent non productive, 4 weeks duration.  The patient has 10 pack smoking history, currently smoking less than 10 cigarettes daily, in addition to daily vaping nicotine and cannabis product.  Denies fever, chills or SOB.  Review of Systems  Sinus congestin, cough, runny, itchy nose  Objective   Pulse 65   Ht 1.727 m (5' 8\")   Wt 78 kg (172 lb)   LMP 04/10/2023 (Exact Date)   SpO2 97%   BMI 26.15 kg/m²     Physical Exam  NAD. Cooperative.  HEENT: nasal and pharyngeal mucosa erythematous   Neck: WNL  Lungs CTA  Heart: RRR  Assessment/Plan   Diagnoses and all orders for this visit:  Allergic sinusitis  -     azelastine (Astelin) 137 mcg (0.1 %) nasal spray 2 spray  -     Referral to Allergy; Future  Chronic cough  -     Referral to Allergy; Future  Bronchospasm  -     albuterol (ProAir HFA) 90 mcg/actuation inhaler; Inhale 2 puffs every 4 hours if needed for wheezing or shortness of breath.  -     Referral to Allergy; Future  Nicotine dependence, uncomplicated, unspecified nicotine product type  Vaping nicotine dependence, tobacco product  Current every day cannabis vaping  Smoking cessation was strongly recommended, discussed health  short and long term benefits associated with smoking and vaping cessation.        "

## 2023-06-22 LAB
CLUE CELLS: PRESENT
NUGENT SCORE: 8
YEAST: ABNORMAL

## 2023-06-23 LAB
CHLAMYDIA TRACH., AMPLIFIED: NEGATIVE
N. GONORRHEA, AMPLIFIED: NEGATIVE
TRICHOMONAS VAGINALIS: NEGATIVE

## 2023-10-16 ENCOUNTER — HOSPITAL ENCOUNTER (EMERGENCY)
Facility: HOSPITAL | Age: 33
Discharge: HOME | End: 2023-10-16
Payer: COMMERCIAL

## 2023-10-16 VITALS
SYSTOLIC BLOOD PRESSURE: 100 MMHG | RESPIRATION RATE: 16 BRPM | OXYGEN SATURATION: 99 % | TEMPERATURE: 97.9 F | DIASTOLIC BLOOD PRESSURE: 63 MMHG | HEART RATE: 85 BPM

## 2023-10-16 PROCEDURE — 99283 EMERGENCY DEPT VISIT LOW MDM: CPT

## 2023-10-16 PROCEDURE — 4500999001 HC ED NO CHARGE

## 2023-10-16 ASSESSMENT — COLUMBIA-SUICIDE SEVERITY RATING SCALE - C-SSRS
6. HAVE YOU EVER DONE ANYTHING, STARTED TO DO ANYTHING, OR PREPARED TO DO ANYTHING TO END YOUR LIFE?: NO
2. HAVE YOU ACTUALLY HAD ANY THOUGHTS OF KILLING YOURSELF?: NO
1. IN THE PAST MONTH, HAVE YOU WISHED YOU WERE DEAD OR WISHED YOU COULD GO TO SLEEP AND NOT WAKE UP?: NO

## 2023-10-17 NOTE — ED NOTES
"Pt came outside of ED room and states \"I'm a little irritated, I've been in here for 1 hour and no one has checked on me\" Pt made aware that pt is waiting on provider to come see patient. Pt verbally aggressive with this RN and states \"I'm going to the lobby and leaving\" MD aware. Pt marked as LWTC      Yanni Barrera RN  10/16/23 2016    "

## 2023-12-04 ENCOUNTER — LAB (OUTPATIENT)
Dept: LAB | Facility: LAB | Age: 33
End: 2023-12-04
Payer: COMMERCIAL

## 2023-12-04 ENCOUNTER — OFFICE VISIT (OUTPATIENT)
Dept: OBSTETRICS AND GYNECOLOGY | Facility: CLINIC | Age: 33
End: 2023-12-04
Payer: COMMERCIAL

## 2023-12-04 VITALS
DIASTOLIC BLOOD PRESSURE: 76 MMHG | WEIGHT: 184 LBS | BODY MASS INDEX: 29.57 KG/M2 | HEIGHT: 66 IN | SYSTOLIC BLOOD PRESSURE: 120 MMHG

## 2023-12-04 DIAGNOSIS — R23.2 HOT FLASHES: ICD-10-CM

## 2023-12-04 DIAGNOSIS — N89.8 VAGINAL DISCHARGE: ICD-10-CM

## 2023-12-04 DIAGNOSIS — R23.2 HOT FLASHES: Primary | ICD-10-CM

## 2023-12-04 LAB
ERYTHROCYTE [DISTWIDTH] IN BLOOD BY AUTOMATED COUNT: 12.3 % (ref 11.5–14.5)
HCT VFR BLD AUTO: 40.1 % (ref 36–46)
HGB BLD-MCNC: 13.5 G/DL (ref 12–16)
MCH RBC QN AUTO: 30.3 PG (ref 26–34)
MCHC RBC AUTO-ENTMCNC: 33.7 G/DL (ref 32–36)
MCV RBC AUTO: 90 FL (ref 80–100)
NRBC BLD-RTO: 0 /100 WBCS (ref 0–0)
PLATELET # BLD AUTO: 218 X10*3/UL (ref 150–450)
RBC # BLD AUTO: 4.46 X10*6/UL (ref 4–5.2)
TSH SERPL-ACNC: 2.6 MIU/L (ref 0.44–3.98)
WBC # BLD AUTO: 6.9 X10*3/UL (ref 4.4–11.3)

## 2023-12-04 PROCEDURE — 99213 OFFICE O/P EST LOW 20 MIN: CPT | Performed by: OBSTETRICS & GYNECOLOGY

## 2023-12-04 PROCEDURE — 84443 ASSAY THYROID STIM HORMONE: CPT

## 2023-12-04 PROCEDURE — 4004F PT TOBACCO SCREEN RCVD TLK: CPT | Performed by: OBSTETRICS & GYNECOLOGY

## 2023-12-04 PROCEDURE — 85027 COMPLETE CBC AUTOMATED: CPT

## 2023-12-04 PROCEDURE — 36415 COLL VENOUS BLD VENIPUNCTURE: CPT

## 2023-12-04 ASSESSMENT — ENCOUNTER SYMPTOMS
APPETITE CHANGE: 0
CONSTITUTIONAL NEGATIVE: 0
ENDOCRINE NEGATIVE: 0
CARDIOVASCULAR NEGATIVE: 0
BLOOD IN STOOL: 0
HEMATOLOGIC/LYMPHATIC NEGATIVE: 0
HEMATURIA: 0
RESPIRATORY NEGATIVE: 0
ALLERGIC/IMMUNOLOGIC NEGATIVE: 0
NEUROLOGICAL NEGATIVE: 0
DYSURIA: 0
FATIGUE: 0
PSYCHIATRIC NEGATIVE: 0
SHORTNESS OF BREATH: 0
FREQUENCY: 0
FEVER: 0
SLEEP DISTURBANCE: 0
CONSTIPATION: 0
NAUSEA: 0
DIARRHEA: 0
BACK PAIN: 0
EYES NEGATIVE: 0
VOMITING: 0
UNEXPECTED WEIGHT CHANGE: 0
ABDOMINAL DISTENTION: 0
FLANK PAIN: 0
ABDOMINAL PAIN: 0
MUSCULOSKELETAL NEGATIVE: 0
CHILLS: 0
COLOR CHANGE: 0
GASTROINTESTINAL NEGATIVE: 0

## 2023-12-04 ASSESSMENT — PAIN SCALES - GENERAL: PAINLEVEL: 0-NO PAIN

## 2023-12-04 NOTE — PROGRESS NOTES
Megan Sosa is a 33 y.o. No obstetric history on file. here for possible perimenopause symptoms    HPI:   Pt currently using Nexplanon for birth control. She had it replaced about 5 months ago. She has been having irregular cycles, vaginal dryness and hot flashes among other symptoms she cannot recall today.   She takes abilify and lexapro and there have been no changes to those medications recently.   She is sexually active with men but recently broke up with her partner.   She vapes and smokes cigarettes (about 5-7 per day).  Has h/o LEEP x 2.      Cyles now are 1-2 times per month or every few months. Bleeding typically lasts 4-5 days, which is shorter than previously. Some increased clotting.           Obstetric History  OB History   No obstetric history on file.        Past Medical History  She has a past medical history of Chronic maxillary sinusitis (02/15/2018), Chronic maxillary sinusitis (02/15/2018), Chronic sinusitis, unspecified (02/15/2018), Chronic sinusitis, unspecified (02/15/2018), Deviated nasal septum (02/15/2018), Deviated nasal septum (02/15/2018), Drug-induced headache, not elsewhere classified, not intractable (02/15/2018), Drug-induced headache, not elsewhere classified, not intractable (02/15/2018), Hypertrophy of nasal turbinates (02/15/2018), Hypertrophy of nasal turbinates (02/15/2018), Obstructive sleep apnea (adult) (pediatric) (02/15/2018), Obstructive sleep apnea (adult) (pediatric) (02/15/2018), Obstructive sleep apnea (adult) (pediatric) (02/15/2018), Obstructive sleep apnea (adult) (pediatric) (02/15/2018), Other conditions influencing health status (02/15/2018), Other conditions influencing health status (02/15/2018), Other specified disorders of nose and nasal sinuses (02/15/2018), Other specified disorders of nose and nasal sinuses (02/15/2018), Other specified disorders of nose and nasal sinuses (02/15/2018), Other specified disorders of nose and nasal sinuses (02/15/2018),  "Other stimulant dependence, uncomplicated (CMS/[facility]) (02/15/2018), Other stimulant dependence, uncomplicated (CMS/[facility]) (02/15/2018), Personal history of other diseases of the digestive system (04/17/2017), Personal history of other diseases of the nervous system and sense organs (08/23/2019), Personal history of other mental and behavioral disorders, Plantar fascial fibromatosis, Plantar fascial fibromatosis (02/15/2018), Plantar fascial fibromatosis (02/15/2018), Schizoaffective disorder, unspecified (CMS/[facility]) (11/16/2013), Sleep apnea, unspecified (02/15/2018), and Sleep apnea, unspecified (02/15/2018).    Surgical History  She has a past surgical history that includes Cervical biopsy w/ loop electrode excision (01/29/2015).     Social History  She reports that she has been smoking cigarettes. She uses smokeless tobacco. She reports current alcohol use. She reports that she does not use drugs.    Family History  Family History   Problem Relation Name Age of Onset    Depression Mother      Breast cancer Maternal Grandmother      Pancreatic cancer Paternal Grandfather           /76 (BP Location: Right arm, Patient Position: Sitting, BP Cuff Size: Adult)   Ht 1.676 m (5' 6\")   Wt 83.5 kg (184 lb)   LMP 11/25/2023   BMI 29.70 kg/m²   Patient's last menstrual period was 11/25/2023.    Review of Systems   Constitutional:  Negative for appetite change, chills, fatigue, fever and unexpected weight change.   Respiratory:  Negative for shortness of breath.    Cardiovascular:  Negative for chest pain.   Gastrointestinal:  Negative for abdominal distention, abdominal pain, blood in stool, constipation, diarrhea, nausea and vomiting.   Endocrine: Positive for heat intolerance. Negative for cold intolerance.   Genitourinary:  Positive for vaginal discharge. Negative for dyspareunia, dysuria, flank pain, frequency, genital sores, hematuria, menstrual problem, pelvic pain, urgency, vaginal bleeding and vaginal pain.      "   Vaginal dryness   Musculoskeletal:  Negative for back pain.   Skin:  Negative for color change.   Psychiatric/Behavioral:  Negative for sleep disturbance.        Physical Exam  Constitutional:       Appearance: Normal appearance.   HENT:      Head: Normocephalic and atraumatic.   Abdominal:      General: Abdomen is flat.      Palpations: Abdomen is soft.      Tenderness: There is no abdominal tenderness.   Genitourinary:     General: Normal vulva.      Vagina: Normal.      Cervix: Normal.      Uterus: Normal.       Adnexa: Right adnexa normal and left adnexa normal.      Comments: Cervical tissue very scan with scarring at top of vagina  Skin:     General: Skin is warm and dry.   Neurological:      Mental Status: She is alert and oriented to person, place, and time.   Psychiatric:         Mood and Affect: Mood normal.           Assessment and Plan:    Discussed with patient there could be many reasons for her symptoms. She admits to increased stress as well as recently re-placing her Nexlanon.  We cannot accurately check hormone levels with Nexplanon in place.    Plan to check TSH and CBC.   Also check vaginitis panel and STI cultures.   If all wnl we discussed possibility of treating symptoms (vaginal estrogen, etc) or removing Nexplanon to assess cycles/symptoms at that time.   Pt agreeable.

## 2024-05-23 ENCOUNTER — PROCEDURE VISIT (OUTPATIENT)
Dept: OBSTETRICS AND GYNECOLOGY | Facility: CLINIC | Age: 34
End: 2024-05-23
Payer: COMMERCIAL

## 2024-05-23 VITALS
HEIGHT: 68 IN | BODY MASS INDEX: 28.79 KG/M2 | WEIGHT: 190 LBS | DIASTOLIC BLOOD PRESSURE: 75 MMHG | SYSTOLIC BLOOD PRESSURE: 120 MMHG

## 2024-05-23 DIAGNOSIS — Z30.8 ENCOUNTER FOR OTHER CONTRACEPTIVE MANAGEMENT: Primary | ICD-10-CM

## 2024-05-23 PROCEDURE — 11982 REMOVE DRUG IMPLANT DEVICE: CPT | Performed by: OBSTETRICS & GYNECOLOGY

## 2024-05-23 RX ORDER — ACETAMINOPHEN, DIPHENHYDRAMINE HCL, PHENYLEPHRINE HCL 325; 25; 5 MG/1; MG/1; MG/1
1 TABLET ORAL NIGHTLY
COMMUNITY

## 2024-05-23 RX ORDER — ARIPIPRAZOLE LAUROXIL 1064 MG/3.9ML
INJECTION, SUSPENSION, EXTENDED RELEASE INTRAMUSCULAR
COMMUNITY
Start: 2024-03-25

## 2024-05-23 ASSESSMENT — PAIN SCALES - GENERAL: PAINLEVEL: 0-NO PAIN

## 2024-05-23 NOTE — PROGRESS NOTES
"Patient ID: Megan Sosa is a 33 y.o. female.  Pt here for nexpalnon removal.   Although placement note reports nexplanon in left arm, nexplanon actually palpated in right arm.     Visit Vitals  /75 (BP Location: Left arm, Patient Position: Sitting)   Ht 1.727 m (5' 8\")   Wt 86.2 kg (190 lb)   LMP 05/08/2024 (Exact Date)   BMI 28.89 kg/m²   OB Status Having periods   Smoking Status Every Day   BSA 2.03 m²        Insertion of Contraceptive Capsule    Date/Time: 5/23/2024 10:36 AM    Performed by: Viola SHARP MD  Authorized by: Viola SHARP MD    Consent:     Consent obtained:  Verbal    Consent given by:  Patient    Procedural risks discussed:  Bleeding, repeat procedure, failure rate and infection    Patient questions answered: yes      Patient agrees, verbalizes understanding, and wants to proceed: yes    Universal Protocol:     Patient states understanding of procedure being performed: yes      Site marked: yes    Indication:     Indication: Presence of non-biodegradable drug delivery implant    Pre-procedure:     Pre-procedure timeout performed: yes      Prepped with: povidone-iodine      Local anesthetic:  Lidocaine with epinephrine    The site was cleaned and prepped in a sterile fashion: yes    Procedure:     Procedure:  Removal    Small stab incision was made in arm: yes      Left/right:  Right    Visualization of implant was obtained: yes    Comments:      Nexplanon removed and noted to be fully intact.  Incision closed with bandage.       Nexplanon removed without difficulty.     Pt plans to TTC.  Recommend starting PNV now.  Start tracking cycles -- using an cyrus can be very helpful. May take a few months for cycles to be regular after nexplanon removal.  Ideally patient would quit tobacco use prior to conception.   We reviewed that it is typically best to remain on medications for mood stabilization if they are working well for.  There is no good data for lexapro or abilify use during " pregnancy but no data to suggest significant harm.

## 2024-07-29 ENCOUNTER — APPOINTMENT (OUTPATIENT)
Dept: PRIMARY CARE | Facility: CLINIC | Age: 34
End: 2024-07-29
Payer: COMMERCIAL

## 2024-07-29 VITALS
RESPIRATION RATE: 18 BRPM | HEIGHT: 68 IN | WEIGHT: 190.2 LBS | HEART RATE: 61 BPM | BODY MASS INDEX: 28.82 KG/M2 | OXYGEN SATURATION: 96 % | TEMPERATURE: 97 F | SYSTOLIC BLOOD PRESSURE: 110 MMHG | DIASTOLIC BLOOD PRESSURE: 72 MMHG

## 2024-07-29 DIAGNOSIS — R53.83 OTHER FATIGUE: ICD-10-CM

## 2024-07-29 DIAGNOSIS — J34.2 NASAL SEPTAL DEVIATION: ICD-10-CM

## 2024-07-29 DIAGNOSIS — F20.9 SCHIZOPHRENIA, UNSPECIFIED TYPE (MULTI): ICD-10-CM

## 2024-07-29 DIAGNOSIS — F32.A DEPRESSION, UNSPECIFIED DEPRESSION TYPE: ICD-10-CM

## 2024-07-29 DIAGNOSIS — F31.70 BIPOLAR DISORDER IN PARTIAL REMISSION, MOST RECENT EPISODE UNSPECIFIED TYPE (MULTI): Primary | ICD-10-CM

## 2024-07-29 DIAGNOSIS — N93.9 ABNORMAL UTERINE BLEEDING (AUB): ICD-10-CM

## 2024-07-29 DIAGNOSIS — K21.00 GASTROESOPHAGEAL REFLUX DISEASE WITH ESOPHAGITIS, UNSPECIFIED WHETHER HEMORRHAGE: ICD-10-CM

## 2024-07-29 DIAGNOSIS — F15.20 CAFFEINE DEPENDENCE (MULTI): ICD-10-CM

## 2024-07-29 PROBLEM — N76.0 BV (BACTERIAL VAGINOSIS): Status: ACTIVE | Noted: 2024-07-29

## 2024-07-29 PROBLEM — F10.10 ALCOHOL ABUSE: Status: ACTIVE | Noted: 2021-09-09

## 2024-07-29 PROBLEM — F50.81 BINGE EATING DISORDER: Status: ACTIVE | Noted: 2021-09-09

## 2024-07-29 PROBLEM — F50.819 BINGE EATING DISORDER: Status: ACTIVE | Noted: 2021-09-09

## 2024-07-29 PROBLEM — F31.9 BIPOLAR 1 DISORDER, DEPRESSED (MULTI): Status: ACTIVE | Noted: 2022-07-05

## 2024-07-29 PROBLEM — S70.10XA CONTUSION OF THIGH: Status: ACTIVE | Noted: 2024-07-29

## 2024-07-29 PROBLEM — M21.40 ACQUIRED PES PLANUS: Status: ACTIVE | Noted: 2024-07-29

## 2024-07-29 PROBLEM — F41.1 GENERALIZED ANXIETY DISORDER: Status: ACTIVE | Noted: 2021-09-09

## 2024-07-29 PROBLEM — M72.2 PLANTAR FASCIITIS: Status: ACTIVE | Noted: 2018-01-04

## 2024-07-29 PROBLEM — B96.89 BV (BACTERIAL VAGINOSIS): Status: ACTIVE | Noted: 2024-07-29

## 2024-07-29 PROBLEM — F43.10 POSTTRAUMATIC STRESS DISORDER: Status: ACTIVE | Noted: 2023-05-25

## 2024-07-29 PROBLEM — Z86.59 HISTORY OF MANIC DEPRESSIVE DISORDER: Status: ACTIVE | Noted: 2024-07-29

## 2024-07-29 PROCEDURE — 99214 OFFICE O/P EST MOD 30 MIN: CPT | Performed by: FAMILY MEDICINE

## 2024-07-29 PROCEDURE — 3008F BODY MASS INDEX DOCD: CPT | Performed by: FAMILY MEDICINE

## 2024-07-29 RX ORDER — FAMOTIDINE 20 MG/1
20 TABLET, FILM COATED ORAL 2 TIMES DAILY
Qty: 60 TABLET | Refills: 5 | Status: SHIPPED | OUTPATIENT
Start: 2024-07-29 | End: 2025-01-25

## 2024-07-29 RX ORDER — ACETAMINOPHEN, DIPHENHYDRAMINE HCL, PHENYLEPHRINE HCL 325; 25; 5 MG/1; MG/1; MG/1
1 TABLET ORAL NIGHTLY
Qty: 90 TABLET | Refills: 3 | Status: SHIPPED | OUTPATIENT
Start: 2024-07-29

## 2024-07-29 RX ORDER — ARIPIPRAZOLE LAUROXIL 1064 MG/3.9ML
INJECTION, SUSPENSION, EXTENDED RELEASE INTRAMUSCULAR
Status: CANCELLED | OUTPATIENT
Start: 2024-07-29

## 2024-07-29 RX ORDER — ESCITALOPRAM OXALATE 20 MG/1
20 TABLET ORAL DAILY
Qty: 90 TABLET | Refills: 1 | Status: SHIPPED | OUTPATIENT
Start: 2024-07-29

## 2024-07-29 ASSESSMENT — ENCOUNTER SYMPTOMS
CHOKING: 0
CHEST TIGHTNESS: 0
ABDOMINAL PAIN: 0
HEMATURIA: 0
APNEA: 0
VOICE CHANGE: 0
EYE DISCHARGE: 0
DIZZINESS: 1
DYSURIA: 0
LIGHT-HEADEDNESS: 0
RHINORRHEA: 1
SINUS PRESSURE: 1
DIFFICULTY URINATING: 0
COUGH: 0
FATIGUE: 1
ARTHRALGIAS: 0
BLOOD IN STOOL: 0
DIARRHEA: 0
WHEEZING: 0
SHORTNESS OF BREATH: 1
FLANK PAIN: 0
SEIZURES: 0
HEADACHES: 1
CONSTIPATION: 1
NECK PAIN: 0
ABDOMINAL DISTENTION: 0
SINUS PAIN: 1
POLYPHAGIA: 0
TROUBLE SWALLOWING: 0
BRUISES/BLEEDS EASILY: 1
NAUSEA: 1
POLYDIPSIA: 0
EYE ITCHING: 0
CHILLS: 0
ADENOPATHY: 0
SORE THROAT: 0

## 2024-07-29 ASSESSMENT — PATIENT HEALTH QUESTIONNAIRE - PHQ9
1. LITTLE INTEREST OR PLEASURE IN DOING THINGS: SEVERAL DAYS
10. IF YOU CHECKED OFF ANY PROBLEMS, HOW DIFFICULT HAVE THESE PROBLEMS MADE IT FOR YOU TO DO YOUR WORK, TAKE CARE OF THINGS AT HOME, OR GET ALONG WITH OTHER PEOPLE: SOMEWHAT DIFFICULT
2. FEELING DOWN, DEPRESSED OR HOPELESS: SEVERAL DAYS
SUM OF ALL RESPONSES TO PHQ9 QUESTIONS 1 AND 2: 2

## 2024-07-29 NOTE — PATIENT INSTRUCTIONS
Please consider the following medications to help    mitigate  Covid during this time  Vitamin  mg    daily  B complex daily  ZINC   30   MG  DAILY     VITAMIN D 3   200O IU DAILY        Please consider exercise program involving walking or some other form of aerobic activity 5 days weekly for 30 minutes... Let's also consider strengthening of large muscle groups like the abdominal muscles or shoulder muscles... Twice weekly with reps of 5/10/15 exercises and gradually increase strength.. This is not heavy strength training but light weight training... Sit ups or back exercise routine.. Please ask for handout if uncertain how to do..This  will help to strengthen your muscles which in turn will help you to lose weight.... You might ask what is the best diet available.. I would strongly encourage you to consider  Weight Watchers.. And as  your  fellow on  Weight Watchers physician attempting to  live this  LIFE  style  choice with you....  I will be glad to give you recommendations on what to eat.. Consider buying Tamra bread.., stu bagle thin bread.. oikos yogurt... eggs  to eat as hard-boiled... Halo top ice cream for snack... All these are delicious foods which.. when eaten and  being compliant eating three  meals daily  breakfast lunch and dinner, drinking  64 ounces of water daily we will all win together !!!!!!!. This will be a means for you to lose weight... Consider also the smart phone cyrus ... My plate.. Or My  fitness  pal..,  as additional possibilities for weight loss... Good  luctyson Hinojosa!    Discussed medication side effects.  The  risk benefits and treatment options  discussed with patient.       Please schedule follow-up appointment based upon your improvement/failure to improve/chronic medical conditions and physician recommendations during office appointment at the .       Patient advised to go to er if symptoms worsen or to call answering service, or to return to office  for additional evaluation    This note was partially  generated using Dragon voice recognition and there may be incorrect words, wording, spelling, or pronunciation errors that were not corrected prior to committing the note to the medical record.

## 2024-07-29 NOTE — PROGRESS NOTES
Subjective   Patient ID: Megan Sosa is a 34 y.o. female who presents for Establish Care (Discuss mental health).  HPI  Bipolar disorder type  2   ..  Has  ANGER  ISSSUES   LITTLE  PATIENCE  A LOT  OF STRESS ANXIETY AND ANGER  AND DEPRESSION  DONT   HEAR ANY  VOICES    H/O  SELF HARM   WAS  UNITARIAN    .. MORE HUMAN  BASED  .  Review of Systems   Constitutional:  Positive for fatigue. Negative for chills.   HENT:  Positive for rhinorrhea, sinus pressure and sinus pain. Negative for congestion, dental problem, hearing loss, nosebleeds, sneezing, sore throat, trouble swallowing and voice change.         DEVIATED SEPTUM   Eyes:  Negative for discharge, itching and visual disturbance.   Respiratory:  Positive for shortness of breath. Negative for apnea, cough, choking, chest tightness and wheezing.    Cardiovascular:  Positive for chest pain. Negative for leg swelling.   Gastrointestinal:  Positive for constipation and nausea. Negative for abdominal distention, abdominal pain, blood in stool and diarrhea.   Endocrine: Positive for cold intolerance and heat intolerance. Negative for polydipsia, polyphagia and polyuria.   Genitourinary:  Negative for difficulty urinating, dyspareunia, dysuria, flank pain, genital sores, hematuria and pelvic pain.   Musculoskeletal:  Negative for arthralgias and neck pain.   Neurological:  Positive for dizziness and headaches. Negative for seizures, syncope and light-headedness.        MEMORY    Hematological:  Negative for adenopathy. Bruises/bleeds easily.   Psychiatric/Behavioral:          ADHD       Objective   Physical Exam  Vitals: I have reviewed the vitals  General: Well-developed.  In no acute distress.  Eyes:   sclera nonicteric.  Conjunctiva not injected.  No discharge.   HEAD: Normocephalic, atraumatic.  HEENT   Mucous membranes moist.  Posterior oropharynx nonerythematous, no tonsillar exudates.      No cervical lymphadenopathy.  Cardio: Regular rate and rhythm.  No murmur,  rub or gallop.  Pulmonary: Lungs clear to auscultation in all fields.  No accessory muscle use.  GI/: Normal active bowel sounds.  Soft, nontender.  No masses or organomegaly appreciated.  Musculoskeletal: No gross deformities appreciated.  Neuro: Alert, age-appropriate.  Normal muscle tone.  Moving all extremities.  Skin: No rash, bruises or lesions.   Labs    CBC  Order: [id number]   Status: Final result       Visible to patient: Yes (seen)       Dx: Hot flashes    0 Result Notes            Component  Ref Range & Units 7 mo ago  (12/4/23) 2 yr ago  (7/22/22) 2 yr ago  (6/26/22) 3 yr ago  (4/17/21) 3 yr ago  (4/15/21) 3 yr ago  (4/14/21) 3 yr ago  (4/1/21)   WBC  4.4 - 11.3 x10*3/uL 6.9 7.0 R 6.1 R 7.5 R 4.9 R 6.5 R 5.2 R   nRBC  0.0 - 0.0 /100 WBCs 0.0   0.0 R 0.0 R 0.0 R 0.0 R   RBC  4.00 - 5.20 x10*6/uL 4.46 4.32 R 5.00 R 5.14 R 4.08 R 4.01 R 3.96 Low  R   Hemoglobin  12.0 - 16.0 g/dL 13.5 11.6 Low  12.8 9.6 Low  7.7 Low  6.7 Low  6.6 Low    Hematocrit  36.0 - 46.0 % 40.1 36.2 40.8 33.2 Low  26.7 Low  25.2 Low  26.1 Low    MCV  80 - 100 fL 90 84 82 65 Low  65 Low  63 Low  66 Low    MCH  26.0 - 34.0 pg 30.3         MCHC  32.0 - 36.0 g/dL 33.7 32.0 31.4 Low  28.9 Low  28.8 Low  26.6 Low  25.3 Low    RDW  11.5 - 14.5 % 12.3 16.6 High  15.9 High  22.7 High  20.4 High  18.6 High  19.1 High    Platelets  150 - 450 x10*3/uL 218 217 R 250 R 350 R 233 R        TSH with reflex to Free T4 if abnormal  Order: [id number]   Status: Final result       Visible to patient: Yes (seen)       Dx: Hot flashes    0 Result Notes       Component  Ref Range & Units 7 mo ago 2 yr ago   Thyroid Stimulating Hormone  0.44 - 3.98 mIU/L 2.60 1.39 CM   Resulting Agency [facility]              Narrative  Performed by: [facility]  TSH testing is performed using different testing methodology at [facility] than at other [facility] hospitals. Direct result comparisons should only be made within the same method.      Specimen  Collected: 12/04/23 12:31 Last Resulted: 12/04/23 16:23        Lab Flowsheet        Order Details        View Encounter        Lab and Collection Details        Routing        Result History     View All Conversations on this Encounter        CM=Additional comments        Result Care Coordination      Patient Communication     Add Comments   Seen Back to Top        Danny Maddox [facility]  Outside Information      suggestion  Information displayed in this report may not trend or trigger automated decision support.     Comprehensive Metabolic Panel  Order: [id number]  Component  Ref Range & Units 1 yr ago   Sodium  135 - 144 mEq/L 140   Potassium  3.4 - 4.9 mEq/L 3.8   Chloride  95 - 107 mEq/L 105   CO2  20 - 31 mEq/L 26   Anion Gap  9 - 15 mEq/L 9   Glucose  70 - 99 mg/dL 81   BUN  6 - 20 mg/dL 9   Creatinine  0.50 - 0.90 mg/dL 0.67   Est, Glom Filt Rate  >60 >60.0   Comment: Pediatric calculator link  https://www.kidney.org/professionals/kdoqi/gfr_calculatorped  Effective Oct 3, 2022  These results are not intended for use in patients  <18 years of age.  eGFR results are calculated without  a race factor using the 2021 CKD-EPI equation.  Careful  clinical correlation is recommended, particularly when  comparing to results calculated using previous equations.  The CKD-EPI equation is less accurate in patients with  extremes of muscle mass, extra-renal metabolism of  creatinine, excessive creatinine ingestion, or following  therapy that affects renal tubular secretion.   Calcium  8.5 - 9.9 mg/dL 8.8   Total Protein  6.3 - 8.0 g/dL 6.3   Albumin  3.5 - 4.6 g/dL 3.8   Total Bilirubin  0.2 - 0.7 mg/dL 0.6   Alkaline Phosphatase  40 - 130 U/L 45   ALT  0 - 33 U/L 16   AST  0 - 35 U/L 14   Globulin  2.3 - 3.5 g/dL 2.5   Resulting Agency [facility]     Specimen Collected: 10/25/22 05:32    Performed by: [facility] Last Resulted: 10/25/22 07:39   [facility] Danny Maddox  [facility]  Result Received: 07/29/24      Result Report      Assessment/Plan   Problem List Items Addressed This Visit       Caffeine dependence (Multi)    Relevant Medications    melatonin 10 mg tablet    Bipolar disorder, unspecified (Multi) - Primary     PSYCHIATRY REFERRAL          Depression     CONTINUE      LEXAPRO          Relevant Orders    Lipid Panel    Fatigue    Relevant Orders    Magnesium    CBC and Auto Differential    Schizophrenia (Multi)    Relevant Medications    escitalopram (Lexapro) 20 mg tablet    Other Relevant Orders    Referral to Psychiatry    Abnormal uterine bleeding (AUB)     TO  GYN          Relevant Orders    Referral to Gynecology    Reflux esophagitis     WATCH  COFFEE  COLA CHOCOLATE TEA   TRY  PEPCID 20  MG TWICE DAILY          Relevant Medications    famotidine (Pepcid) 20 mg tablet     Other Visit Diagnoses       Nasal septal deviation        Relevant Orders    Referral to ENT

## 2024-09-06 ENCOUNTER — APPOINTMENT (OUTPATIENT)
Dept: OBSTETRICS AND GYNECOLOGY | Facility: CLINIC | Age: 34
End: 2024-09-06
Payer: COMMERCIAL

## 2024-09-12 ENCOUNTER — APPOINTMENT (OUTPATIENT)
Dept: BEHAVIORAL HEALTH | Facility: CLINIC | Age: 34
End: 2024-09-12
Payer: COMMERCIAL

## 2024-09-20 ENCOUNTER — APPOINTMENT (OUTPATIENT)
Dept: OTOLARYNGOLOGY | Facility: CLINIC | Age: 34
End: 2024-09-20
Payer: COMMERCIAL

## 2024-09-30 ENCOUNTER — APPOINTMENT (OUTPATIENT)
Dept: PRIMARY CARE | Facility: CLINIC | Age: 34
End: 2024-09-30
Payer: COMMERCIAL

## 2024-12-10 ENCOUNTER — APPOINTMENT (OUTPATIENT)
Dept: PRIMARY CARE | Facility: CLINIC | Age: 34
End: 2024-12-10
Payer: COMMERCIAL

## 2024-12-10 VITALS
OXYGEN SATURATION: 99 % | SYSTOLIC BLOOD PRESSURE: 128 MMHG | BODY MASS INDEX: 30.62 KG/M2 | HEIGHT: 68 IN | TEMPERATURE: 97.9 F | DIASTOLIC BLOOD PRESSURE: 76 MMHG | WEIGHT: 202 LBS | HEART RATE: 66 BPM

## 2024-12-10 DIAGNOSIS — N92.3 VAGINAL BLEEDING BETWEEN PERIODS: ICD-10-CM

## 2024-12-10 DIAGNOSIS — J06.9 URTI (ACUTE UPPER RESPIRATORY INFECTION): Primary | ICD-10-CM

## 2024-12-10 PROCEDURE — 3008F BODY MASS INDEX DOCD: CPT

## 2024-12-10 PROCEDURE — 99212 OFFICE O/P EST SF 10 MIN: CPT

## 2024-12-10 RX ORDER — PREDNISONE 20 MG/1
3 TABLET ORAL DAILY
COMMUNITY
Start: 2024-12-02

## 2024-12-10 RX ORDER — BROMPHENIRAMINE MALEATE, PSEUDOEPHEDRINE HYDROCHLORIDE, AND DEXTROMETHORPHAN HYDROBROMIDE 2; 30; 10 MG/5ML; MG/5ML; MG/5ML
10 SYRUP ORAL 3 TIMES DAILY PRN
COMMUNITY
Start: 2024-12-02

## 2024-12-10 RX ORDER — AMOXICILLIN 875 MG/1
875 TABLET, FILM COATED ORAL 2 TIMES DAILY
COMMUNITY
Start: 2024-12-02

## 2024-12-10 ASSESSMENT — PATIENT HEALTH QUESTIONNAIRE - PHQ9
5. POOR APPETITE OR OVEREATING: NEARLY EVERY DAY
10. IF YOU CHECKED OFF ANY PROBLEMS, HOW DIFFICULT HAVE THESE PROBLEMS MADE IT FOR YOU TO DO YOUR WORK, TAKE CARE OF THINGS AT HOME, OR GET ALONG WITH OTHER PEOPLE: VERY DIFFICULT
7. TROUBLE CONCENTRATING ON THINGS, SUCH AS READING THE NEWSPAPER OR WATCHING TELEVISION: NEARLY EVERY DAY
9. THOUGHTS THAT YOU WOULD BE BETTER OFF DEAD, OR OF HURTING YOURSELF: SEVERAL DAYS
SUM OF ALL RESPONSES TO PHQ9 QUESTIONS 1 AND 2: 4
8. MOVING OR SPEAKING SO SLOWLY THAT OTHER PEOPLE COULD HAVE NOTICED. OR THE OPPOSITE, BEING SO FIGETY OR RESTLESS THAT YOU HAVE BEEN MOVING AROUND A LOT MORE THAN USUAL: MORE THAN HALF THE DAYS
2. FEELING DOWN, DEPRESSED OR HOPELESS: MORE THAN HALF THE DAYS
4. FEELING TIRED OR HAVING LITTLE ENERGY: NEARLY EVERY DAY
6. FEELING BAD ABOUT YOURSELF - OR THAT YOU ARE A FAILURE OR HAVE LET YOURSELF OR YOUR FAMILY DOWN: MORE THAN HALF THE DAYS
3. TROUBLE FALLING OR STAYING ASLEEP OR SLEEPING TOO MUCH: NEARLY EVERY DAY
SUM OF ALL RESPONSES TO PHQ QUESTIONS 1-9: 21
1. LITTLE INTEREST OR PLEASURE IN DOING THINGS: MORE THAN HALF THE DAYS

## 2024-12-10 ASSESSMENT — ENCOUNTER SYMPTOMS: DEPRESSION: 0

## 2024-12-10 NOTE — LETTER
December 10, 2024     Patient: Megan Sosa   YOB: 1990   Date of Visit: 12/10/2024       To Whom It May Concern:    Megan Sosa was seen in my clinic on 12/10/2024 at 8:30 am. Please excuse Megan for her absence from work on this day to make the appointment.    If you have any questions or concerns, please don't hesitate to call.         Sincerely,         Last Hernadez MD        CC: No Recipients

## 2024-12-10 NOTE — PROGRESS NOTES
"Subjective   Patient ID: Megan Sosa is a 34 y.o. female who presents for Follow-up (Patient here for urgent care follow-up. Stated that she tested inconclusive for COVID. C/o headaches, fatigue, runny nose, tiredness, and loss of taste. Patient advised that she does experience chronic headaches. Patient Rx'd Amoxil, Prednisone, and cough syrup, but stated that she is not really taking medications as prescribed. ).    HPI   34-year-old female with past medical history of schizophrenia presented today for follow-up.  Patient stated that she was sick about 10 days ago when she was evaluated in urgent care and was prescribed prednisone, albuterol as well as amoxicillin for upper respiratory tract infection.  She tested negative for COVID, patient stated that she is improving, she has not been taking her medications.  Patient has taken couple days off work when she was sick.  Patient stated that her symptoms are improving slowly.  She also stated that her she has vaginal bleeding between.'s associated with a cramps and nausea, she was following with gynecology however she moved in the area and would like to establish a new gynecologist.  Patient stated that her bleeding is mainly spotting with no heavy vaginal bleeding        Review of Systems  ROS is otherwise negative for chest pain, cough, shortness of breath, lightheadedness or dizziness  Objective   /76 (BP Location: Left arm, Patient Position: Sitting, BP Cuff Size: Adult)   Pulse 66   Temp 36.6 °C (97.9 °F) (Temporal)   Ht 1.727 m (5' 8\")   Wt 91.6 kg (202 lb)   SpO2 99%   BMI 30.71 kg/m²     Physical Exam  Constitutional:       Appearance: Normal appearance.   HENT:      Head: Atraumatic.      Mouth/Throat:      Mouth: Mucous membranes are moist.      Pharynx: Oropharynx is clear. No oropharyngeal exudate or posterior oropharyngeal erythema.   Eyes:      General:         Right eye: No discharge.         Left eye: No discharge.      Extraocular " Movements: Extraocular movements intact.      Pupils: Pupils are equal, round, and reactive to light.   Cardiovascular:      Rate and Rhythm: Normal rate and regular rhythm.   Pulmonary:      Effort: No respiratory distress.      Breath sounds: No wheezing, rhonchi or rales.   Chest:      Chest wall: No tenderness.   Abdominal:      General: There is no distension.      Tenderness: There is no abdominal tenderness. There is no right CVA tenderness, left CVA tenderness, guarding or rebound.   Musculoskeletal:         General: Normal range of motion.      Cervical back: Normal range of motion.   Skin:     General: Skin is warm.   Neurological:      General: No focal deficit present.      Mental Status: She is alert.         Assessment/Plan   Problem List Items Addressed This Visit    None  Visit Diagnoses       URTI (acute upper respiratory infection)    -  Primary    Improving, advised to take previously ordered medications.    Vaginal bleeding between periods        Refer to gynecology    Relevant Orders    Referral to Gynecology

## 2024-12-13 ENCOUNTER — APPOINTMENT (OUTPATIENT)
Dept: NEUROLOGY | Facility: HOSPITAL | Age: 34
End: 2024-12-13
Payer: COMMERCIAL

## 2024-12-26 ENCOUNTER — APPOINTMENT (OUTPATIENT)
Dept: OBSTETRICS AND GYNECOLOGY | Facility: CLINIC | Age: 34
End: 2024-12-26
Payer: COMMERCIAL